# Patient Record
Sex: MALE | Race: WHITE | NOT HISPANIC OR LATINO | URBAN - METROPOLITAN AREA
[De-identification: names, ages, dates, MRNs, and addresses within clinical notes are randomized per-mention and may not be internally consistent; named-entity substitution may affect disease eponyms.]

---

## 2020-06-08 ENCOUNTER — OUTPATIENT (OUTPATIENT)
Dept: OUTPATIENT SERVICES | Facility: HOSPITAL | Age: 30
LOS: 1 days | End: 2020-06-08
Payer: COMMERCIAL

## 2020-06-08 DIAGNOSIS — Z11.59 ENCOUNTER FOR SCREENING FOR OTHER VIRAL DISEASES: ICD-10-CM

## 2020-06-09 PROCEDURE — U0003: CPT

## 2020-06-10 LAB — SARS-COV-2 RNA SPEC QL NAA+PROBE: SIGNIFICANT CHANGE UP

## 2021-01-10 ENCOUNTER — TRANSCRIPTION ENCOUNTER (OUTPATIENT)
Age: 31
End: 2021-01-10

## 2021-08-23 PROBLEM — Z00.00 ENCOUNTER FOR PREVENTIVE HEALTH EXAMINATION: Status: ACTIVE | Noted: 2021-08-23

## 2021-10-11 ENCOUNTER — NON-APPOINTMENT (OUTPATIENT)
Age: 31
End: 2021-10-11

## 2021-10-11 ENCOUNTER — APPOINTMENT (OUTPATIENT)
Dept: CARDIOLOGY | Facility: CLINIC | Age: 31
End: 2021-10-11
Payer: COMMERCIAL

## 2021-10-11 VITALS
TEMPERATURE: 98.7 F | HEIGHT: 69 IN | BODY MASS INDEX: 35.4 KG/M2 | OXYGEN SATURATION: 99 % | HEART RATE: 83 BPM | WEIGHT: 239 LBS | SYSTOLIC BLOOD PRESSURE: 140 MMHG | DIASTOLIC BLOOD PRESSURE: 97 MMHG

## 2021-10-11 DIAGNOSIS — Z83.438 FAMILY HISTORY OF OTHER DISORDER OF LIPOPROTEIN METABOLISM AND OTHER LIPIDEMIA: ICD-10-CM

## 2021-10-11 DIAGNOSIS — Z82.49 FAMILY HISTORY OF ISCHEMIC HEART DISEASE AND OTHER DISEASES OF THE CIRCULATORY SYSTEM: ICD-10-CM

## 2021-10-11 DIAGNOSIS — Z86.59 PERSONAL HISTORY OF OTHER MENTAL AND BEHAVIORAL DISORDERS: ICD-10-CM

## 2021-10-11 DIAGNOSIS — R10.9 UNSPECIFIED ABDOMINAL PAIN: ICD-10-CM

## 2021-10-11 DIAGNOSIS — R50.9 FEVER, UNSPECIFIED: ICD-10-CM

## 2021-10-11 DIAGNOSIS — Z78.9 OTHER SPECIFIED HEALTH STATUS: ICD-10-CM

## 2021-10-11 DIAGNOSIS — Z87.19 PERSONAL HISTORY OF OTHER DISEASES OF THE DIGESTIVE SYSTEM: ICD-10-CM

## 2021-10-11 DIAGNOSIS — J02.9 ACUTE PHARYNGITIS, UNSPECIFIED: ICD-10-CM

## 2021-10-11 PROCEDURE — 93000 ELECTROCARDIOGRAM COMPLETE: CPT

## 2021-10-11 PROCEDURE — 99204 OFFICE O/P NEW MOD 45 MIN: CPT

## 2021-10-11 NOTE — REVIEW OF SYSTEMS
[Fever] : fever [Headache] : headache [Chills] : chills [Sore Throat] : sore throat [Chest Discomfort] : chest discomfort [Abdominal Pain] : abdominal pain [Negative] : Heme/Lymph

## 2021-10-11 NOTE — HISTORY OF PRESENT ILLNESS
[FreeTextEntry1] : Edilson is a 31yo male who presents today for cardiac evaluation. He reports he had COVID 19 in 1/2021. Since then he has been experiencing abdominal pain, constipation and diarrhea, had EGD and sigmoidoscopy done 3/2021 and no significant findings. Was told he has hemorrhoids and likely muscle spasms of his abdomen, he was prescribed a medication which he sometimes takes. Patient also with chest pain, reports he had 3 echocardiograms done in the past year , all normal. Patient had a CXR done in the past year and was told this was normal. PFTs done with Pulmonologist which were also normal. Patient had EST done which was normal. Patient was prescribed Meloxicam for his chest pain but states this did not help and he is no longer using it. He was prescribed Prilosec for a few weeks which helped with his abdominal pain but he has since d/c'd use. He is also reporting subjective fevers and chills since having COVID 19. He is also continued to have headaches since receiving the COVID 19 vaccine first dose in August (Moderna). He is also reporting sore throat for the past few weeks. Patient has been prescribed Cymbalta and Amitriptylline for his symptoms with no improvement.

## 2021-10-11 NOTE — PHYSICAL EXAM
[Well Developed] : well developed [Well Nourished] : well nourished [No Acute Distress] : no acute distress [Normal Conjunctiva] : normal conjunctiva [Normal Venous Pressure] : normal venous pressure [No Carotid Bruit] : no carotid bruit [Normal S1, S2] : normal S1, S2 [No Murmur] : no murmur [No Rub] : no rub [No Gallop] : no gallop [Clear Lung Fields] : clear lung fields [Good Air Entry] : good air entry [No Respiratory Distress] : no respiratory distress  [Soft] : abdomen soft [Non Tender] : non-tender [No Masses/organomegaly] : no masses/organomegaly [Normal Bowel Sounds] : normal bowel sounds [Normal Gait] : normal gait [No Edema] : no edema [No Cyanosis] : no cyanosis [No Clubbing] : no clubbing [No Varicosities] : no varicosities [No Rash] : no rash [No Skin Lesions] : no skin lesions [Moves all extremities] : moves all extremities [No Focal Deficits] : no focal deficits [Normal Speech] : normal speech [Alert and Oriented] : alert and oriented [Normal memory] : normal memory [de-identified] : fluid nasal mucosa and fluid in ear  [de-identified] : normal genitals

## 2021-10-14 ENCOUNTER — APPOINTMENT (OUTPATIENT)
Dept: CARDIOLOGY | Facility: CLINIC | Age: 31
End: 2021-10-14
Payer: COMMERCIAL

## 2021-10-14 ENCOUNTER — NON-APPOINTMENT (OUTPATIENT)
Age: 31
End: 2021-10-14

## 2021-10-14 VITALS
TEMPERATURE: 98.3 F | OXYGEN SATURATION: 98 % | HEART RATE: 90 BPM | DIASTOLIC BLOOD PRESSURE: 80 MMHG | WEIGHT: 244 LBS | SYSTOLIC BLOOD PRESSURE: 148 MMHG | HEIGHT: 69 IN | BODY MASS INDEX: 36.14 KG/M2

## 2021-10-14 DIAGNOSIS — M79.602 PAIN IN LEFT ARM: ICD-10-CM

## 2021-10-14 DIAGNOSIS — R89.9 UNSPECIFIED ABNORMAL FINDING IN SPECIMENS FROM OTHER ORGANS, SYSTEMS AND TISSUES: ICD-10-CM

## 2021-10-14 DIAGNOSIS — R79.89 OTHER SPECIFIED ABNORMAL FINDINGS OF BLOOD CHEMISTRY: ICD-10-CM

## 2021-10-14 DIAGNOSIS — U07.1 COVID-19: ICD-10-CM

## 2021-10-14 PROCEDURE — XXXXX: CPT

## 2021-10-14 PROCEDURE — 93000 ELECTROCARDIOGRAM COMPLETE: CPT

## 2021-10-14 PROCEDURE — 99214 OFFICE O/P EST MOD 30 MIN: CPT

## 2021-10-14 PROCEDURE — 93306 TTE W/DOPPLER COMPLETE: CPT

## 2021-10-14 NOTE — HISTORY OF PRESENT ILLNESS
[FreeTextEntry1] : Edilson is a 31yo male who returns today for evaluation. Patient states that he is experiencing chest pain more severe than prior, patient reports his pain is an 8/10 and worse on palpation. He had recent CT chest and abdomen and pelvis done which were unremarkable. patient also with fever and chills today and also with left arm pain. Patient also reporting increased belching.

## 2021-10-15 ENCOUNTER — EMERGENCY (EMERGENCY)
Facility: HOSPITAL | Age: 31
LOS: 1 days | Discharge: HOME | End: 2021-10-15
Attending: EMERGENCY MEDICINE
Payer: COMMERCIAL

## 2021-10-15 ENCOUNTER — NON-APPOINTMENT (OUTPATIENT)
Age: 31
End: 2021-10-15

## 2021-10-15 VITALS
HEART RATE: 97 BPM | RESPIRATION RATE: 20 BRPM | DIASTOLIC BLOOD PRESSURE: 95 MMHG | TEMPERATURE: 99 F | OXYGEN SATURATION: 97 % | SYSTOLIC BLOOD PRESSURE: 137 MMHG | HEIGHT: 69 IN | WEIGHT: 235.01 LBS

## 2021-10-15 VITALS
RESPIRATION RATE: 16 BRPM | SYSTOLIC BLOOD PRESSURE: 117 MMHG | DIASTOLIC BLOOD PRESSURE: 58 MMHG | OXYGEN SATURATION: 99 % | HEART RATE: 68 BPM

## 2021-10-15 LAB
ALBUMIN SERPL ELPH-MCNC: 4.7 G/DL — SIGNIFICANT CHANGE UP (ref 3.3–5)
ALP SERPL-CCNC: 80 U/L — SIGNIFICANT CHANGE UP (ref 40–120)
ALT FLD-CCNC: 32 U/L — SIGNIFICANT CHANGE UP (ref 10–45)
ANION GAP SERPL CALC-SCNC: 13 MMOL/L — SIGNIFICANT CHANGE UP (ref 5–17)
AST SERPL-CCNC: 20 U/L — SIGNIFICANT CHANGE UP (ref 10–40)
BASOPHILS # BLD AUTO: 0.06 K/UL — SIGNIFICANT CHANGE UP (ref 0–0.2)
BASOPHILS NFR BLD AUTO: 0.5 % — SIGNIFICANT CHANGE UP (ref 0–2)
BILIRUB SERPL-MCNC: 0.4 MG/DL — SIGNIFICANT CHANGE UP (ref 0.2–1.2)
BUN SERPL-MCNC: 12 MG/DL — SIGNIFICANT CHANGE UP (ref 7–23)
CALCIUM SERPL-MCNC: 9.4 MG/DL — SIGNIFICANT CHANGE UP (ref 8.4–10.5)
CHLORIDE SERPL-SCNC: 103 MMOL/L — SIGNIFICANT CHANGE UP (ref 96–108)
CO2 SERPL-SCNC: 23 MMOL/L — SIGNIFICANT CHANGE UP (ref 22–31)
CREAT SERPL-MCNC: 0.91 MG/DL — SIGNIFICANT CHANGE UP (ref 0.5–1.3)
D DIMER BLD IA.RAPID-MCNC: <150 NG/ML DDU — SIGNIFICANT CHANGE UP
EOSINOPHIL # BLD AUTO: 0.02 K/UL — SIGNIFICANT CHANGE UP (ref 0–0.5)
EOSINOPHIL NFR BLD AUTO: 0.2 % — SIGNIFICANT CHANGE UP (ref 0–6)
GLUCOSE SERPL-MCNC: 83 MG/DL — SIGNIFICANT CHANGE UP (ref 70–99)
HCT VFR BLD CALC: 44.1 % — SIGNIFICANT CHANGE UP (ref 39–50)
HGB BLD-MCNC: 15.1 G/DL — SIGNIFICANT CHANGE UP (ref 13–17)
IMM GRANULOCYTES NFR BLD AUTO: 0.8 % — SIGNIFICANT CHANGE UP (ref 0–1.5)
LIDOCAIN IGE QN: 21 U/L — SIGNIFICANT CHANGE UP (ref 7–60)
LYMPHOCYTES # BLD AUTO: 17.9 % — SIGNIFICANT CHANGE UP (ref 13–44)
LYMPHOCYTES # BLD AUTO: 2.02 K/UL — SIGNIFICANT CHANGE UP (ref 1–3.3)
MAGNESIUM SERPL-MCNC: 2 MG/DL — SIGNIFICANT CHANGE UP (ref 1.6–2.6)
MCHC RBC-ENTMCNC: 30.4 PG — SIGNIFICANT CHANGE UP (ref 27–34)
MCHC RBC-ENTMCNC: 34.2 GM/DL — SIGNIFICANT CHANGE UP (ref 32–36)
MCV RBC AUTO: 88.7 FL — SIGNIFICANT CHANGE UP (ref 80–100)
MONOCYTES # BLD AUTO: 0.83 K/UL — SIGNIFICANT CHANGE UP (ref 0–0.9)
MONOCYTES NFR BLD AUTO: 7.3 % — SIGNIFICANT CHANGE UP (ref 2–14)
NEUTROPHILS # BLD AUTO: 8.29 K/UL — HIGH (ref 1.8–7.4)
NEUTROPHILS NFR BLD AUTO: 73.3 % — SIGNIFICANT CHANGE UP (ref 43–77)
NRBC # BLD: 0 /100 WBCS — SIGNIFICANT CHANGE UP (ref 0–0)
PLATELET # BLD AUTO: 327 K/UL — SIGNIFICANT CHANGE UP (ref 150–400)
POTASSIUM SERPL-MCNC: 3.9 MMOL/L — SIGNIFICANT CHANGE UP (ref 3.5–5.3)
POTASSIUM SERPL-SCNC: 3.9 MMOL/L — SIGNIFICANT CHANGE UP (ref 3.5–5.3)
PROT SERPL-MCNC: 7.3 G/DL — SIGNIFICANT CHANGE UP (ref 6–8.3)
RBC # BLD: 4.97 M/UL — SIGNIFICANT CHANGE UP (ref 4.2–5.8)
RBC # FLD: 12.3 % — SIGNIFICANT CHANGE UP (ref 10.3–14.5)
SARS-COV-2 RNA SPEC QL NAA+PROBE: SIGNIFICANT CHANGE UP
SODIUM SERPL-SCNC: 139 MMOL/L — SIGNIFICANT CHANGE UP (ref 135–145)
T3 SERPL-MCNC: 141 NG/DL — SIGNIFICANT CHANGE UP (ref 80–200)
T4 AB SER-ACNC: 7.9 UG/DL — SIGNIFICANT CHANGE UP (ref 4.6–12)
TROPONIN T, HIGH SENSITIVITY RESULT: <6 NG/L — SIGNIFICANT CHANGE UP (ref 0–51)
TSH SERPL-MCNC: 0.76 UIU/ML — SIGNIFICANT CHANGE UP (ref 0.27–4.2)
WBC # BLD: 11.31 K/UL — HIGH (ref 3.8–10.5)
WBC # FLD AUTO: 11.31 K/UL — HIGH (ref 3.8–10.5)

## 2021-10-15 PROCEDURE — 71046 X-RAY EXAM CHEST 2 VIEWS: CPT | Mod: 26

## 2021-10-15 PROCEDURE — 84480 ASSAY TRIIODOTHYRONINE (T3): CPT

## 2021-10-15 PROCEDURE — U0005: CPT

## 2021-10-15 PROCEDURE — 85379 FIBRIN DEGRADATION QUANT: CPT

## 2021-10-15 PROCEDURE — 84436 ASSAY OF TOTAL THYROXINE: CPT

## 2021-10-15 PROCEDURE — 99285 EMERGENCY DEPT VISIT HI MDM: CPT | Mod: 25

## 2021-10-15 PROCEDURE — 71046 X-RAY EXAM CHEST 2 VIEWS: CPT

## 2021-10-15 PROCEDURE — 96374 THER/PROPH/DIAG INJ IV PUSH: CPT

## 2021-10-15 PROCEDURE — 93005 ELECTROCARDIOGRAM TRACING: CPT

## 2021-10-15 PROCEDURE — 99285 EMERGENCY DEPT VISIT HI MDM: CPT

## 2021-10-15 PROCEDURE — 36415 COLL VENOUS BLD VENIPUNCTURE: CPT

## 2021-10-15 PROCEDURE — 80053 COMPREHEN METABOLIC PANEL: CPT

## 2021-10-15 PROCEDURE — 84484 ASSAY OF TROPONIN QUANT: CPT

## 2021-10-15 PROCEDURE — 84443 ASSAY THYROID STIM HORMONE: CPT

## 2021-10-15 PROCEDURE — 96375 TX/PRO/DX INJ NEW DRUG ADDON: CPT

## 2021-10-15 PROCEDURE — 83690 ASSAY OF LIPASE: CPT

## 2021-10-15 PROCEDURE — U0003: CPT

## 2021-10-15 PROCEDURE — 85025 COMPLETE CBC W/AUTO DIFF WBC: CPT

## 2021-10-15 PROCEDURE — 83735 ASSAY OF MAGNESIUM: CPT

## 2021-10-15 RX ORDER — PANTOPRAZOLE SODIUM 20 MG/1
40 TABLET, DELAYED RELEASE ORAL ONCE
Refills: 0 | Status: DISCONTINUED | OUTPATIENT
Start: 2021-10-15 | End: 2021-10-15

## 2021-10-15 RX ORDER — METOPROLOL TARTRATE 50 MG
50 TABLET ORAL ONCE
Refills: 0 | Status: DISCONTINUED | OUTPATIENT
Start: 2021-10-15 | End: 2021-10-15

## 2021-10-15 RX ORDER — KETOROLAC TROMETHAMINE 30 MG/ML
15 SYRINGE (ML) INJECTION ONCE
Refills: 0 | Status: DISCONTINUED | OUTPATIENT
Start: 2021-10-15 | End: 2021-10-15

## 2021-10-15 RX ORDER — FAMOTIDINE 10 MG/ML
20 INJECTION INTRAVENOUS ONCE
Refills: 0 | Status: COMPLETED | OUTPATIENT
Start: 2021-10-15 | End: 2021-10-15

## 2021-10-15 RX ORDER — METOPROLOL TARTRATE 50 MG
100 TABLET ORAL ONCE
Refills: 0 | Status: DISCONTINUED | OUTPATIENT
Start: 2021-10-15 | End: 2021-10-15

## 2021-10-15 RX ADMIN — Medication 15 MILLIGRAM(S): at 14:30

## 2021-10-15 RX ADMIN — Medication 15 MILLIGRAM(S): at 14:45

## 2021-10-15 RX ADMIN — Medication 30 MILLILITER(S): at 11:49

## 2021-10-15 RX ADMIN — FAMOTIDINE 20 MILLIGRAM(S): 10 INJECTION INTRAVENOUS at 11:49

## 2021-10-15 NOTE — ED PROVIDER NOTE - PROGRESS NOTE DETAILS
case d/w Dr. Vazquez - pt has had negative cardiac workup this year with persistent atypical chest pain. he requests a CTA/CTcoronary today, requests contacting Dr. Pang to expedite and protocolize - Joey Baum PA-C d/w CTcoronary team - team has a full schedule with emergent cases, will not be able to obtain CTC today. d/w Dr. Pang - will obtain CTA chest for now. he will call to try to add pt to CTC schedule - Joey Baum PA-C d/w Dr. Pang - reports he is trying to expedite CTC for this afternoon. pt reports minimal improvement of epigastric/CP from GI cocktail. +persistent chest wall tenderness. will give NSAID, reassess - Joey Baum PA-C Multiple conversations had between myself, Dr. Vazquez, Dr. Rebolledo regarding obtaining CTA heart w/coronaries today. CTC team would be unable to get pt test until after 17:00 today. pt expressed concerns about waiting in the hospital that long, perseverates on catching covid, claustrophobia, and declined anxiolytic. pt also has reservations about requiring pre-medication with metoprolol. given low suspicion of acute ACS with significantly low HEART score (0) with atypical CP reproducible with chest wall palpation, there is no acute need to get this test today in the ED and pt does not want admission. Will dc with follow up. Discussed plan and return precautions with patient who understands and agrees. All questions answered. - Joey Buam PA-C Multiple conversations had between myself, Dr. Vazquez, Dr. Rebolledo regarding obtaining CTA heart w/coronaries today. CTC team would be unable to get pt test until after 17:00 today. pt expressed concerns about waiting in the hospital that long, perseverates on catching covid, claustrophobia, and declined anxiolytic. pt also has reservations about requiring pre-medication with metoprolol. given low suspicion of acute ACS with significantly low HEART score (0) with atypical CP reproducible with chest wall palpation, there is no acute need to get this test today in the ED and pt does not want admission. pt was also seen by Dr. Pang who agrees very low suspicion for cardiac event. Will dc with follow up. Discussed plan and return precautions with patient who understands and agrees. All questions answered. - Joey Baum PA-C

## 2021-10-15 NOTE — ED ADULT TRIAGE NOTE - CHIEF COMPLAINT QUOTE
General Sunscreen Counseling: I recommended a broad spectrum sunscreen with a SPF of 30 or higher.  I explained that SPF 30 sunscreens block approximately 97 percent of the sun's harmful rays.  Sunscreens should be applied at least 15 minutes prior to expected sun exposure and then every 2 hours after that as long as sun exposure continues. If swimming or exercising sunscreen should be reapplied every 45 minutes to an hour after getting wet or sweating.  One ounce, or the equivalent of a shot glass full of sunscreen, is adequate to protect the skin not covered by a bathing suit. I also recommended a lip balm with a sunscreen as well. Sun protective clothing can be used in lieu of sunscreen but must be worn the entire time you are exposed to the sun's rays.
Detail Level: Zone
chest pain sent by dr arciniega for "CT scans"  c/o chills as well

## 2021-10-15 NOTE — ED PROVIDER NOTE - CLINICAL SUMMARY MEDICAL DECISION MAKING FREE TEXT BOX
Patient sent in by PMD for CT coronary and dissection study. Patient vitals wnl. Patient 29 y/o without cardiac risk factors. Unlikely cardiac etiology of patient persistent symptoms for months. Possible post viral syndrome for patient with also persistent anxiety. Patient has had repeated echos and recent CT chest. Patient has taken multiple covid tests, most recent last night, that were negative. Patient had Covid in Jan 2021.  Will attempt to carry-out PMD request. Discussed with patient that it is my belief that he is continuing to receive to many diagnostic tests and the post viral syndrome should be monitored longitudinally as outpatient.  I understand that patient is receiving differing opinions on his health status and I explained this to the patient also.

## 2021-10-15 NOTE — ED PROVIDER NOTE - PATIENT PORTAL LINK FT
You can access the FollowMyHealth Patient Portal offered by [facility] by registering at the following website: [email or web address]. By joining [employer]’s FollowMyHealth portal, you will also be able to view your health information using other applications (apps) compatible with our system.

## 2021-10-15 NOTE — ED ADULT NURSE REASSESSMENT NOTE - NS ED NURSE REASSESS COMMENT FT1
1435 Cardiologist Dr Poly callaway pt. Pt anxious. States he is afraid something bad will happen to him if he goes home. Reassurance given. Pt states his chest still hurts on palpation diffusely. Slight relief from Toradol

## 2021-10-15 NOTE — ED CLERICAL - NS ED CLERK NOTE PRE-ARRIVAL INFORMATION; ADDITIONAL PRE-ARRIVAL INFORMATION
CC/Reason For referral: seen yesterday for chest pain, d-dimer & troponin normal.  today increased chest pain  Preferred Consultant(if applicable):  Who admits for you (if needed):  Do you have documents you would like to fax over?  Would you still like to speak to an ED attending? please call after patient is seen

## 2021-10-15 NOTE — ED ADULT NURSE NOTE - OBJECTIVE STATEMENT
1105 30 yr old WM sent to ER by Dr Espinosa for Chest CT, further eval and tx. c/o persistent intermittent chills, bodyaches, chest pain, weight loss and generalized weakness x 2 months. EKG was done in triage. States blood tests and COVID test done last night, no results yet. Pt anxious. States he is very scared. Cardiac monitor intact. Airborne and contact isolation maintained. [facility] COVID19 Jan 2021. Was not hospitalized at that time. Color pink. Skin W&D 1105 30 yr old WM sent to ER by Dr Espinosa for Chest CT, further eval and tx. c/o persistent intermittent chills, bodyaches, chest pain, weight loss and generalized weakness x 2 months. EKG was done in triage. States blood tests and COVID test done last night, no results yet. Pt anxious. States he is very scared. Cardiac monitor intact. Airborne and contact isolation maintained. [facility] COVID19 Jan 2021. Was not hospitalized at that time. Color pink. Skin W&D. Lungs clear. Chest TTP diffusely. 1105 30 yr old WM sent to ER by Dr Espinosa for Chest CT, further eval and tx. c/o persistent intermittent chills, bodyaches, chest pain, weight loss and generalized weakness x 2 months. EKG was done in triage. States blood tests and COVID test done last night, no results yet. Pt anxious. States he is very scared. Cardiac monitor intact. Airborne and contact isolation maintained. [facility] COVID19 Jan 2021. Was not hospitalized at that time. Color pink. Skin W&D. Lungs clear. Chest TTP diffusely.. Pt states he is on doxycycline for a few days for ear infection.

## 2021-10-15 NOTE — ED PROVIDER NOTE - ATTENDING CONTRIBUTION TO CARE
I, Benito Rebolledo, performed a history and physical exam of the patient and discussed their management with the resident and /or advanced care provider. I reviewed the resident and /or ACP's note and agree with the documented findings and plan of care. I was present and available for all procedures.  Patient sent in by PMD for CT coronary and dissection study. Patient vitals wnl. Patient 31 y/o without cardiac risk factors. Unlikely cardiac etiology of patient persistent symptoms for months. Possible post viral syndrome for patient with also persistent anxiety. Patient has had repeated echos and recent CT chest. Patient has taken multiple covid tests, most recent last night, that were negative. Patient had Covid in Jan 2021.  Will attempt to carry-out PMD request. Discussed with patient that it is my belief that he is continuing to receive to many diagnostic tests and the post viral syndrome should be monitored longitudinally as outpatient.  I understand that patient is receiving differing opinions on his health status and I explained this to the patient also.

## 2021-10-15 NOTE — CONSULT NOTE ADULT - SUBJECTIVE AND OBJECTIVE BOX
DATE OF SERVICE: 10-15-21      CHIEF COMPLAINT:Patient is a 30y old  Male who presents with a chief complaint of     HISTORY OF PRESENT ILLNESS: HPI: 30y male PMHx COVID-19 infection Jan 2021 p/w chest pain. Pt reports intermittent chest pain x1 yr worse in the last few weeks. the CP is aching, reproducible with palpation of the chest wall, sometimes burning worse after eating. pt denies worse with exertion, position, or after eating. nonsmoker. no FHx cardiac dz at age <65. pt reports since having covid in January he had had migraines, tactile fever, severe reflux, intermittent chest pain (feels different currently), and a lump in his neck. pt currently on abx for an ear infection. pt perseverates "I don't want to die. I hope its not COVID again" denies SI/HI. Pt denies SOB, BENITES, extremity swelling, weight loss, recent travel, body aches, nausea, vomiting, diarrhea, or constipation. no known sick contacts. pt has had negative workup this week including troponin, d-dimer, and CT chest/abd/pelvis though pt reports CT chest was limited. p\      PAST MEDICAL & SURGICAL HISTORY:  COVID-19  Jan 2021    No significant past surgical history        FAMILY HISTORY:      Non-contributory    SOCIAL HISTORY:    not a smoker  Allergies    No Known Allergies    Intolerances    	    REVIEW OF SYSTEMS:  CONSTITUTIONAL: No fever  EYES: No eye pain, visual disturbances, or discharge  ENMT:  No difficulty hearing, tinnitus  NECK: No pain or stiffness  RESPIRATORY: No cough, wheezing,  CARDIOVASCULAR: + chest pain, no palpitations, passing out, dizziness, or leg swelling  GASTROINTESTINAL:  No nausea, vomiting, diarrhea or constipation. No melena.  GENITOURINARY: No dysuria, hematuria  NEUROLOGICAL: No stroke like symptoms  SKIN: No burning or lesions   ENDOCRINE: No heat or cold intolerance  MUSCULOSKELETAL: No joint pain or swelling  PSYCHIATRIC: No  anxiety, mood swings  HEME/LYMPH: No bleeding gums  ALLERGY AND IMMUNOLOGIC: No hives or eczema	    All other ROS negative    PHYSICAL EXAM:  T(C): 36.9 (10-15-21 @ 13:20), Max: 37 (10-15-21 @ 10:30)  HR: 68 (10-15-21 @ 14:45) (68 - 97)  BP: 117/58 (10-15-21 @ 14:45) (117/58 - 160/84)  RR: 16 (10-15-21 @ 14:45) (16 - 20)  SpO2: 99% (10-15-21 @ 14:45) (97% - 99%)  Wt(kg): --  I&O's Summary      Appearance: Normal	  HEENT:   Normal oral mucosa, EOMI	  Cardiovascular:  S1 S2, No JVD,    Respiratory: Lungs clear to auscultation	  Psychiatry: Alert  Gastrointestinal:  Soft, Non-tender, + BS	  Skin: No rashes   Neurologic: Non-focal  Extremities:  No edema  Vascular: Peripheral pulses palpable    	    	  	  CARDIAC MARKERS:  Labs personally reviewed by me                                  15.1   11.31 )-----------( 327      ( 15 Oct 2021 11:46 )             44.1     10-15    139  |  103  |  12  ----------------------------<  83  3.9   |  23  |  0.91    Ca    9.4      15 Oct 2021 11:46  Mg     2.0     10-15    TPro  7.3  /  Alb  4.7  /  TBili  0.4  /  DBili  x   /  AST  20  /  ALT  32  /  AlkPhos  80  10-15          EKG: Personally reviewed by me - NSR  Radiology: Personally reviewed by me - CXR clear lungs      Assessment /Plan:   Chest pain -- appears MSK in origin, reproducible to palpation, not exertional, pinpoint location  - NSAIDS PRN  - PE ruled out  - no further cardiac work up at this time         Differential diagnosis and plan of care discussed with patient after the evaluation. Counseling on diet, nutritional counseling, weight management, exercise and medication compliance was done.   Advanced care planning/advanced directives discussed with patient/family. DNR status including forceful chest compressions to attempt to restart the heart, ventilator support/artificial breathing, electric shock, artificial nutrition, health care proxy, Molst form all discussed with pt. Pt wishes to consider. More than fifteen minutes spent on discussing advanced directives. OMT on six regions for acute somatic dysfunctions done at the bedside.           Lucas Pang DO [facility]  Cardiovascular Medicine  [address] [place] [address], [address]  Office [phone]  Cell [phone]

## 2021-10-15 NOTE — ED PROVIDER NOTE - OBJECTIVE STATEMENT
30y male PMHx COVID-19 infection Jan 2021 p/w chest pain. Pt reports intermittent chest pain x1 yr worse in the last few weeks. the CP is aching, reproducible with palpation of the chest wall, sometimes burning worse after eating. pt denies worse with exertion, position, or after eating. nonsmoker. no FHx cardiac dz at age <65. pt reports since having covid in January he had had migraines, tactile fever, severe reflux, intermittent chest pain (feels different currently), and a lump in his neck. pt currently on abx for an ear infection. Pt denies SOB, BENITES, extremity swelling, weight loss, recent travel, body aches, nausea, vomiting, diarrhea, or constipation. no known sick contacts. pt has had negative workup this week including troponin, d-dimer, and CT chest/abd/pelvis though pt reports CT chest was limited. pt was sent by cardiology Dr. Vazquez for CTA chest 30y male PMHx COVID-19 infection Jan 2021 p/w chest pain. Pt reports intermittent chest pain x1 yr worse in the last few weeks. the CP is aching, reproducible with palpation of the chest wall, sometimes burning worse after eating. pt denies worse with exertion, position, or after eating. nonsmoker. no FHx cardiac dz at age <65. pt reports since having covid in January he had had migraines, tactile fever, severe reflux, intermittent chest pain (feels different currently), and a lump in his neck. pt currently on abx for an ear infection. pt perseverates "I don't want to die. I hope its not COVID again" denies SI/HI. Pt denies SOB, BENITES, extremity swelling, weight loss, recent travel, body aches, nausea, vomiting, diarrhea, or constipation. no known sick contacts. pt has had negative workup this week including troponin, d-dimer, and CT chest/abd/pelvis though pt reports CT chest was limited. pt was sent by cardiology Dr. Vazquez for CTA chest

## 2021-10-15 NOTE — ED PROVIDER NOTE - NSFOLLOWUPINSTRUCTIONS_ED_ALL_ED_FT
Follow up with your PMD and/or cardiologist within 48-72 hours.   *Bring a copy of all printed lab/test results with you.*    Take all of your other medications as previously prescribed.  Take ibuprofen 400-600mg every 6 hrs as needed for pain. Take with food     Return to the ED for worsening chest pain, shortness of breath, loss of consciousness, or any other concerns.

## 2021-10-18 ENCOUNTER — NON-APPOINTMENT (OUTPATIENT)
Age: 31
End: 2021-10-18

## 2021-10-18 ENCOUNTER — APPOINTMENT (OUTPATIENT)
Dept: CARDIOLOGY | Facility: CLINIC | Age: 31
End: 2021-10-18
Payer: COMMERCIAL

## 2021-10-18 ENCOUNTER — INPATIENT (INPATIENT)
Facility: HOSPITAL | Age: 31
LOS: 2 days | Discharge: HOME | DRG: 206 | End: 2021-10-21
Attending: INTERNAL MEDICINE | Admitting: INTERNAL MEDICINE
Payer: COMMERCIAL

## 2021-10-18 VITALS
HEART RATE: 94 BPM | WEIGHT: 235.01 LBS | TEMPERATURE: 99 F | OXYGEN SATURATION: 97 % | SYSTOLIC BLOOD PRESSURE: 141 MMHG | RESPIRATION RATE: 18 BRPM | HEIGHT: 69 IN | DIASTOLIC BLOOD PRESSURE: 92 MMHG

## 2021-10-18 DIAGNOSIS — R07.9 CHEST PAIN, UNSPECIFIED: ICD-10-CM

## 2021-10-18 DIAGNOSIS — R07.89 OTHER CHEST PAIN: ICD-10-CM

## 2021-10-18 DIAGNOSIS — Z86.16 PERSONAL HISTORY OF COVID-19: ICD-10-CM

## 2021-10-18 DIAGNOSIS — Z29.9 ENCOUNTER FOR PROPHYLACTIC MEASURES, UNSPECIFIED: ICD-10-CM

## 2021-10-18 DIAGNOSIS — R63.4 ABNORMAL WEIGHT LOSS: ICD-10-CM

## 2021-10-18 DIAGNOSIS — K21.9 GASTRO-ESOPHAGEAL REFLUX DISEASE WITHOUT ESOPHAGITIS: ICD-10-CM

## 2021-10-18 DIAGNOSIS — F41.9 ANXIETY DISORDER, UNSPECIFIED: ICD-10-CM

## 2021-10-18 DIAGNOSIS — D72.829 ELEVATED WHITE BLOOD CELL COUNT, UNSPECIFIED: ICD-10-CM

## 2021-10-18 PROBLEM — U07.1 COVID-19: Chronic | Status: ACTIVE | Noted: 2021-10-15

## 2021-10-18 LAB
ALBUMIN SERPL ELPH-MCNC: 4.8 G/DL — SIGNIFICANT CHANGE UP (ref 3.3–5)
ALP SERPL-CCNC: 84 U/L — SIGNIFICANT CHANGE UP (ref 40–120)
ALT FLD-CCNC: 37 U/L — SIGNIFICANT CHANGE UP (ref 10–45)
ANION GAP SERPL CALC-SCNC: 12 MMOL/L — SIGNIFICANT CHANGE UP (ref 5–17)
AST SERPL-CCNC: 21 U/L — SIGNIFICANT CHANGE UP (ref 10–40)
BASOPHILS # BLD AUTO: 0.06 K/UL — SIGNIFICANT CHANGE UP (ref 0–0.2)
BASOPHILS NFR BLD AUTO: 0.3 % — SIGNIFICANT CHANGE UP (ref 0–2)
BILIRUB SERPL-MCNC: 0.6 MG/DL — SIGNIFICANT CHANGE UP (ref 0.2–1.2)
BUN SERPL-MCNC: 10 MG/DL — SIGNIFICANT CHANGE UP (ref 7–23)
CALCIUM SERPL-MCNC: 9.8 MG/DL — SIGNIFICANT CHANGE UP (ref 8.4–10.5)
CHLORIDE SERPL-SCNC: 102 MMOL/L — SIGNIFICANT CHANGE UP (ref 96–108)
CO2 SERPL-SCNC: 24 MMOL/L — SIGNIFICANT CHANGE UP (ref 22–31)
CREAT SERPL-MCNC: 1.1 MG/DL — SIGNIFICANT CHANGE UP (ref 0.5–1.3)
EOSINOPHIL # BLD AUTO: 0.01 K/UL — SIGNIFICANT CHANGE UP (ref 0–0.5)
EOSINOPHIL NFR BLD AUTO: 0.1 % — SIGNIFICANT CHANGE UP (ref 0–6)
GLUCOSE SERPL-MCNC: 85 MG/DL — SIGNIFICANT CHANGE UP (ref 70–99)
HCT VFR BLD CALC: 45.5 % — SIGNIFICANT CHANGE UP (ref 39–50)
HGB BLD-MCNC: 15.2 G/DL — SIGNIFICANT CHANGE UP (ref 13–17)
HPIV2 RNA SPEC QL NAA+PROBE: DETECTED
IMM GRANULOCYTES NFR BLD AUTO: 0.7 % — SIGNIFICANT CHANGE UP (ref 0–1.5)
LYMPHOCYTES # BLD AUTO: 14.1 % — SIGNIFICANT CHANGE UP (ref 13–44)
LYMPHOCYTES # BLD AUTO: 2.59 K/UL — SIGNIFICANT CHANGE UP (ref 1–3.3)
MAGNESIUM SERPL-MCNC: 1.9 MG/DL — SIGNIFICANT CHANGE UP (ref 1.6–2.6)
MCHC RBC-ENTMCNC: 29.6 PG — SIGNIFICANT CHANGE UP (ref 27–34)
MCHC RBC-ENTMCNC: 33.4 GM/DL — SIGNIFICANT CHANGE UP (ref 32–36)
MCV RBC AUTO: 88.5 FL — SIGNIFICANT CHANGE UP (ref 80–100)
MONOCYTES # BLD AUTO: 1.03 K/UL — HIGH (ref 0–0.9)
MONOCYTES NFR BLD AUTO: 5.6 % — SIGNIFICANT CHANGE UP (ref 2–14)
NEUTROPHILS # BLD AUTO: 14.59 K/UL — HIGH (ref 1.8–7.4)
NEUTROPHILS NFR BLD AUTO: 79.2 % — HIGH (ref 43–77)
NRBC # BLD: 0 /100 WBCS — SIGNIFICANT CHANGE UP (ref 0–0)
PLATELET # BLD AUTO: 346 K/UL — SIGNIFICANT CHANGE UP (ref 150–400)
POTASSIUM SERPL-MCNC: 3.8 MMOL/L — SIGNIFICANT CHANGE UP (ref 3.5–5.3)
POTASSIUM SERPL-SCNC: 3.8 MMOL/L — SIGNIFICANT CHANGE UP (ref 3.5–5.3)
PROT SERPL-MCNC: 7.9 G/DL — SIGNIFICANT CHANGE UP (ref 6–8.3)
RAPID RVP RESULT: DETECTED
RBC # BLD: 5.14 M/UL — SIGNIFICANT CHANGE UP (ref 4.2–5.8)
RBC # FLD: 12.3 % — SIGNIFICANT CHANGE UP (ref 10.3–14.5)
SARS-COV-2 RNA SPEC QL NAA+PROBE: SIGNIFICANT CHANGE UP
SARS-COV-2 RNA SPEC QL NAA+PROBE: SIGNIFICANT CHANGE UP
SODIUM SERPL-SCNC: 138 MMOL/L — SIGNIFICANT CHANGE UP (ref 135–145)
TROPONIN T, HIGH SENSITIVITY RESULT: <6 NG/L — SIGNIFICANT CHANGE UP (ref 0–51)
WBC # BLD: 18.4 K/UL — HIGH (ref 3.8–10.5)
WBC # FLD AUTO: 18.4 K/UL — HIGH (ref 3.8–10.5)

## 2021-10-18 PROCEDURE — 93015 CV STRESS TEST SUPVJ I&R: CPT

## 2021-10-18 PROCEDURE — 99285 EMERGENCY DEPT VISIT HI MDM: CPT

## 2021-10-18 PROCEDURE — 71275 CT ANGIOGRAPHY CHEST: CPT | Mod: 26

## 2021-10-18 PROCEDURE — 93010 ELECTROCARDIOGRAM REPORT: CPT

## 2021-10-18 PROCEDURE — 71046 X-RAY EXAM CHEST 2 VIEWS: CPT | Mod: 26

## 2021-10-18 PROCEDURE — 99223 1ST HOSP IP/OBS HIGH 75: CPT

## 2021-10-18 RX ORDER — ESCITALOPRAM OXALATE 10 MG/1
1 TABLET, FILM COATED ORAL
Qty: 0 | Refills: 0 | DISCHARGE

## 2021-10-18 RX ORDER — LANOLIN ALCOHOL/MO/W.PET/CERES
3 CREAM (GRAM) TOPICAL AT BEDTIME
Refills: 0 | Status: DISCONTINUED | OUTPATIENT
Start: 2021-10-18 | End: 2021-10-21

## 2021-10-18 RX ORDER — ALUMINUM ZIRCONIUM TRICHLOROHYDREX GLY 0.2 G/G
1 STICK TOPICAL
Qty: 0 | Refills: 0 | DISCHARGE

## 2021-10-18 RX ORDER — ENOXAPARIN SODIUM 100 MG/ML
40 INJECTION SUBCUTANEOUS DAILY
Refills: 0 | Status: DISCONTINUED | OUTPATIENT
Start: 2021-10-18 | End: 2021-10-21

## 2021-10-18 RX ORDER — ACETAMINOPHEN 500 MG
650 TABLET ORAL EVERY 6 HOURS
Refills: 0 | Status: DISCONTINUED | OUTPATIENT
Start: 2021-10-18 | End: 2021-10-21

## 2021-10-18 RX ORDER — MAGNESIUM SULFATE 500 MG/ML
1 VIAL (ML) INJECTION ONCE
Refills: 0 | Status: COMPLETED | OUTPATIENT
Start: 2021-10-18 | End: 2021-10-18

## 2021-10-18 RX ORDER — ONDANSETRON 8 MG/1
4 TABLET, FILM COATED ORAL EVERY 8 HOURS
Refills: 0 | Status: DISCONTINUED | OUTPATIENT
Start: 2021-10-18 | End: 2021-10-21

## 2021-10-18 RX ORDER — LACTOBACILLUS ACIDOPHILUS 100MM CELL
1 CAPSULE ORAL DAILY
Refills: 0 | Status: DISCONTINUED | OUTPATIENT
Start: 2021-10-18 | End: 2021-10-21

## 2021-10-18 RX ORDER — MELOXICAM 15 MG/1
1 TABLET ORAL
Qty: 0 | Refills: 0 | DISCHARGE

## 2021-10-18 RX ORDER — PANTOPRAZOLE SODIUM 20 MG/1
40 TABLET, DELAYED RELEASE ORAL
Refills: 0 | Status: DISCONTINUED | OUTPATIENT
Start: 2021-10-18 | End: 2021-10-21

## 2021-10-18 RX ORDER — POTASSIUM CHLORIDE 20 MEQ
20 PACKET (EA) ORAL ONCE
Refills: 0 | Status: COMPLETED | OUTPATIENT
Start: 2021-10-18 | End: 2021-10-18

## 2021-10-18 RX ADMIN — Medication 20 MILLIEQUIVALENT(S): at 18:46

## 2021-10-18 RX ADMIN — Medication 100 GRAM(S): at 18:46

## 2021-10-18 NOTE — H&P ADULT - NSHPPHYSICALEXAM_GEN_ALL_CORE
CONSTITUTIONAL: NAD, well-developed, well-groomed  EYES: PERRLA; conjunctiva and sclera clear  ENMT: Moist oral mucosa, no pharyngeal injection or exudates; normal dentition  NECK: Supple, no palpable masses; no thyromegaly  RESPIRATORY: Normal respiratory effort; lungs are clear to auscultation bilaterally  CARDIOVASCULAR: +reproducible anterior chest wall pain b/l, +Regular rate and rhythm, normal S1 and S2, no murmur/rub/gallop; No lower extremity edema; Peripheral pulses are 2+ bilaterally  ABDOMEN: Soft, Non-distended,  Nontender to palpation, normoactive bowel sounds  MUSCULOSKELETAL:  No clubbing or cyanosis of digits; no joint swelling or tenderness to palpation  PSYCH: A+O to person, place, and time; affect appropriate, anxious appearing, perseverating requiring a lot of reassurance  NEUROLOGY: CN 2-12 are intact and symmetric; no gross sensory deficits   SKIN: No rashes; no palpable lesions

## 2021-10-18 NOTE — H&P ADULT - PROBLEM SELECTOR PLAN 3
very anxious on exam. several times pausing to ask "am I going to die?" or "please tell me I'm not going to die" for reassurance.  - was prescribed escitalopram 5mg daily by his PCP recently but states he has not been taking it as he had adverse side effects from another medication (cymbalta)  - would benefit from behavioral health eval while here he also endorses abd pain, nausea, belching, dry heaving and 30lb weight loss in last several months. he had EGD that he states was negative. is on and off PPI but does feel better when on PPI. would check to see if they tested for h. pylori during last EGD  - though doesn't endorse diarrhea, does have abd pain, n/v, excessive belching, bloating, dry heaving and unintentional weight loss. possible h. pylori vs. small intestinal bacterial overgrowth (SIBO) can be on ddx. may benefit from repeat GI eval if cardiac eval unremarkable

## 2021-10-18 NOTE — H&P ADULT - PROBLEM SELECTOR PLAN 4
takes omeprazole on and off. has been off it for few weeks now  - states had EGD that was unremarkable  - start pantoprazole 40mg daily  - maalox PRN very anxious on exam. several times pausing to ask "am I going to die?" or "please tell me I'm not going to die" for reassurance.  - was prescribed escitalopram 5mg daily by his PCP recently but states he has not been taking it as he had adverse side effects from another medication (cymbalta)  - would benefit from behavioral health eval while here

## 2021-10-18 NOTE — H&P ADULT - NSHPADDITIONALINFOADULT_GEN_ALL_CORE
.  Mirna Agustin MD  Division of Hospital Medicine  [facility]   Pager: [phone]    Plan discussed with patient, father bedside, Cardiology attending Dr. Pang, and medicine ACP Makayla.

## 2021-10-18 NOTE — ED PROVIDER NOTE - PROGRESS NOTE DETAILS
Ever Lou PGY-2 spoke with Dr. Vazquez, recommending admission under Dr. Francisco. Dr francisco at bedside, state will admit for CTA, CT coronary and psych evaluation for anxiety

## 2021-10-18 NOTE — ED ADULT NURSE NOTE - OBJECTIVE STATEMENT
30y male presents to the ER c/o chest pain. pt is alert and oriented x 4 and speaking coherently. Pt states he has had intermittent chest pain x1 year worse in the last few weeks. pt describes the chest pain as aching, the pain is reproducible with palpation of the chest wall. sine redness noted to chest "Pt states I was rubbing it"  pt denies worse with exertion, position, or after eating. nonsmoker.  pt currently on abx for an ear infection. Pt believes he has covid again and states "I don't want to die". Pt placed on CM, EKG completed. VSS. pt afebrile. Md callaway completed. will reassess.

## 2021-10-18 NOTE — ED PROVIDER NOTE - NS ED ROS FT
Constitutional: No fever, chills.  Eyes:  No visual changes  ENMT:  No neck pain  Cardiac:  +chest pain  Respiratory:  No cough, SOB  GI:  No nausea, vomiting, diarrhea, abdominal pain.  :  No dysuria, hematuria  MS:  No back pain.  Neuro:  No headache or lightheadedness  Skin:  No skin rash  Except as documented in the HPI,  all other systems are negative.

## 2021-10-18 NOTE — CONSULT NOTE ADULT - SUBJECTIVE AND OBJECTIVE BOX
DATE OF SERVICE: 10-18-21     CHIEF COMPLAINT:Patient is a 30y old  Male who presents with a chief complaint of chest pain (18 Oct 2021 18:49)      HISTORY OF PRESENT ILLNESS:HPI:  29 yo male with PMH of COVID-19 infection in Jan 2021 who presents with subacute worsening of his is intermittent chest pain over last few weeks. He states ever since his COVID-19 infection, he has not felt the same and has had intermittent sharp/burning intermittent chest pains along with GI upset. He describes the chest pain as sometimes sharp and sometimes burning both in his anterior left chest wall and anterior right chest wall, reproducible with palpation. Exacerbated with certain movements, positional changes, and sometimes after eating. His symptoms associated with subjective fever, migraine headaches, occasional lightheadedness, sore throat, nausea, dry heaving, and belching. Denies any recent sick contacts, cough, dyspnea, diarrhea, constipation, dysuria nor frequency. Of note, he recently had b/l earache for which he was taking doxycycline for since 10/12/21 (last dose this morning) with symptoms resolved. Patient was sent into the ED by his PCP for further evaluation.    In the ED, vitals unremarkable. Labs notable for leukocytosis to 18K, trop neg x 2, COVID-19 PCR negative. CXR with clear lungs.  (18 Oct 2021 18:49)      PAST MEDICAL & SURGICAL HISTORY:  COVID-19 Jan 2021    No significant past surgical history      MEDICATIONS:  enoxaparin Injectable 40 milliGRAM(s) SubCutaneous daily        acetaminophen   Tablet .. 650 milliGRAM(s) Oral every 6 hours PRN  melatonin 3 milliGRAM(s) Oral at bedtime PRN  ondansetron Injectable 4 milliGRAM(s) IV Push every 8 hours PRN    aluminum hydroxide/magnesium hydroxide/simethicone Suspension 30 milliLiter(s) Oral every 4 hours PRN  pantoprazole    Tablet 40 milliGRAM(s) Oral before breakfast          FAMILY HISTORY:      Non-contributory    SOCIAL HISTORY:    [ ] not a smoker    Allergies    No Known Allergies    Intolerances    	    REVIEW OF SYSTEMS:  CONSTITUTIONAL: No fever  EYES: No eye pain, visual disturbances, or discharge  ENMT:  No difficulty hearing, tinnitus  NECK: No pain or stiffness  RESPIRATORY: No cough, wheezing,  CARDIOVASCULAR: No chest pain, palpitations, passing out, dizziness, or leg swelling  GASTROINTESTINAL:  No nausea, vomiting, diarrhea or constipation. No melena.  GENITOURINARY: No dysuria, hematuria  NEUROLOGICAL: No stroke like symptoms  SKIN: No burning or lesions   ENDOCRINE: No heat or cold intolerance  MUSCULOSKELETAL: No joint pain or swelling  PSYCHIATRIC: No  anxiety, mood swings  HEME/LYMPH: No bleeding gums  ALLERGY AND IMMUNOLOGIC: No hives or eczema	    All other ROS negative    PHYSICAL EXAM:  T(C): 37 (10-18-21 @ 20:39), Max: 37.6 (10-18-21 @ 14:23)  HR: 79 (10-18-21 @ 20:39) (79 - 95)  BP: 148/82 (10-18-21 @ 20:39) (112/67 - 163/95)  RR: 18 (10-18-21 @ 20:39) (16 - 18)  SpO2: 96% (10-18-21 @ 20:39) (96% - 100%)  Wt(kg): --  I&O's Summary      Appearance: Normal	  HEENT:   Normal oral mucosa, EOMI	  Cardiovascular:  S1 S2, No JVD,    Respiratory: Lungs clear to auscultation	  Psychiatry: Alert  Gastrointestinal:  Soft, Non-tender, + BS	  Skin: No rashes   Neurologic: Non-focal  Extremities:  No edema  Vascular: Peripheral pulses palpable    	    	  	  CARDIAC MARKERS:  Labs personally reviewed by me                                  15.2   18.40 )-----------( 346      ( 18 Oct 2021 15:57 )             45.5     10-18    138  |  102  |  10  ----------------------------<  85  3.8   |  24  |  1.10    Ca    9.8      18 Oct 2021 15:57  Mg     1.9     10-18    TPro  7.9  /  Alb  4.8  /  TBili  0.6  /  DBili  x   /  AST  21  /  ALT  37  /  AlkPhos  84  10-18          EKG: Personally reviewed by me - nsr  Radiology: Personally reviewed by me - chest Gissel clear lungs      Assessment /Plan:   Assessment:  · Assessment	  29 yo male with PMH of COVID-19 infection in Jan 2021 who presents with subacute worsening of his is intermittent chest pain over last few weeks.    Problem/Plan - 1:  ·  Problem: Atypical chest pain.   ·  Plan: less likely cardiac  - f/u CTA chest to r/o PE  - plan for CT coronaries     Problem/Plan - 2:  ·  Problem: Leukocytosis.   ·  Plan: has history of persistent leukocytosis for which he had seen hematology for.  - leukocytosis to 18K today with left shift currently  - +parainfluzenza  - check procal in AM   - will check set of blood culture with AM labs  - hematology eval in morning.- Dr Fady Servin   - ID eval in AM    Problem/Plan - 3:  ·  Problem: Anxiety.  ·  Plan: very anxious on exam. several times pausing to ask "am I going to die?" or "please tell me I'm not going to die" for reassurance.  - was prescribed escitalopram 5mg daily by his PCP recently but states he has not been taking it as he had adverse side effects from another medication (cymbalta)  - Psychiatry consult in AM    Problem/Plan - 4:  ·  Problem: GERD (gastroesophageal reflux disease).  ·  Plan: takes omeprazole on and off. has been off it for few weeks now  - states had EGD that was unremarkable  - start pantoprazole 40mg daily  - maalox PRN.- GI eval Dr Stiles    Problem/Plan - 5:  ·  Problem: Prophylactic measure.   ·  Plan: DVT ppx: lovenox.      Differential diagnosis and plan of care discussed with patient after the evaluation. Counseling on diet, nutritional counseling, weight management, exercise and medication compliance was done.   Advanced care planning/advanced directives discussed with patient/family. DNR status including forceful chest compressions to attempt to restart the heart, ventilator support/artificial breathing, electric shock, artificial nutrition, health care proxy, Molst form all discussed with pt. Pt wishes to consider. More than fifteen minutes spent on discussing advanced directives. OMT on six regions for acute somatic dysfunctions done at the bedside.        Lucas Pang DO [facility]  Cardiovascular Medicine  [address], [address]  Office [phone]  Cell [phone]

## 2021-10-18 NOTE — H&P ADULT - PROBLEM SELECTOR PLAN 1
has intermittent sharp/burning chest pain on both anterior left and right chest wall reproducible with palpation.   low suspicion for cardiac etiology. suspect MSK vs. GI/GERD related.  - trop negative x 2, EKG with no ischemic changes  - f/u CTA chest to r/o PE  - plan for CT coronaries tomorrow  - was prescribed meloxicam by PCP to trial which he has not taken. for now will hold off on NSAIDs as to not exacerbate his GI/reflux symptoms

## 2021-10-18 NOTE — H&P ADULT - ASSESSMENT
31 yo male with PMH of COVID-19 infection in Jan 2021 who presents with subacute worsening of his is intermittent chest pain over last few weeks.

## 2021-10-18 NOTE — H&P ADULT - PROBLEM SELECTOR PLAN 6
DVT ppx: lovenox COVID-19 infection in Jan 2021.  Received 1st dose of COVID-19 vaccine in August but had 4 days of fever, chills, myalgias, thus did not receive 2nd dose.

## 2021-10-18 NOTE — H&P ADULT - HISTORY OF PRESENT ILLNESS
29 yo male with PMH of COVID-19 infection in Jan 2021 who presents with subacute worsening of his is intermittent chest pain over last few weeks. He states ever since his COVID-19 infection, he has not felt the same and has had intermittent sharp/burning intermittent chest pains along with GI upset. He describes the chest pain as sometimes sharp and sometimes burning both in his anterior left chest wall and anterior right chest wall, reproducible with palpation. Exacerbated with certain movements, positional changes, and sometimes after eating. His symptoms associated with subjective fever, migraine headaches, occasional lightheadedness, sore throat, nausea, dry heaving, and belching. Denies any recent sick contacts, cough, dyspnea, diarrhea, constipation, dysuria nor frequency. Of note, he recently had b/l earache for which he was taking doxycycline for since 10/12/21 (last dose this morning) with symptoms resolved. Patient was sent into the ED by his PCP for further evaluation.    In the ED, vitals unremarkable. Labs notable for leukocytosis to 18K, trop neg x 2, COVID-19 PCR negative. CXR with clear lungs. Admitted under Dr. Pang's service for further work-up.

## 2021-10-18 NOTE — ED PROVIDER NOTE - CLINICAL SUMMARY MEDICAL DECISION MAKING FREE TEXT BOX
30y male PMHx COVID-19 infection Jan 2021 p/w chest pain. vss on exam no acute findings. lungs cta, abd ntnd. anxious male. likely anxiety, panic disorder l;ow suspicion for pe, acs, ptx, dissection. will eval with labs and admit

## 2021-10-18 NOTE — H&P ADULT - NSHPSOCIALHISTORY_GEN_ALL_CORE
denies smoking  denies EtOH nor drug use  lives with wife and daughter  used to work as  but no longer able to work

## 2021-10-18 NOTE — ED ADULT NURSE NOTE - TEMPLATE LIST FOR HEAD TO TOE ASSESSMENT
18        Alma Rosa Velarde  [address] [address]          Patient:  Alma Rosa Velarde : 1965  MRN#: [id number]      Dear Alma Rosa:    We have been unable to reach you by phone.  Please contact the office at [phone] your earliest convenience to discuss OVERDUE LABS.      Sincerely,          NAIF Fajardo  [facility] CARDIOLOGY SERVICES  [address] [place] [id number] [phone]  
Cardiac

## 2021-10-18 NOTE — ED CLERICAL - NS ED CLERK NOTE PRE-ARRIVAL INFORMATION; ADDITIONAL PRE-ARRIVAL INFORMATION
CC/Reason For referral: Chest pain/Psych eval needed/hematology eval needed/CTA combo needed   Preferred Consultant(if applicable):  Who admits for you (if needed):  Do you have documents you would like to fax over?  Would you still like to speak to an ED attending? Yes

## 2021-10-18 NOTE — ED PROVIDER NOTE - ATTENDING CONTRIBUTION TO CARE
Patient is a 29 yo M with history of COVID19 in January 2021 here for chest discomfort for the past few weeks. Patient is worried he is going to die. He was seen 10/15/21 and had a negative work up including negative d-dimer and trop < 6. He is a non-smoker. He was sent in by Dr. Vazquez today for admission and evaluation by Dr. Pang. He denies fevers, chills. He reports feeling hot. He is worried he has an infection. No known DVT/PE's.     VS noted  Gen. no acute distress, Non toxic, anxious  HEENT: EOMI, mmm  Lungs: CTAB/L no C/ W /R   CVS: RRR   Abd; Soft non tender, non distended   Ext: no edema, no calf tenderness  Skin: no rash  Neuro AAOx3 non focal clear speech  a/p: chest pain - ekg wnl. plan for ACS work up, CTA chest as requested by Dr. Pang and admission.   - Carolynn LONGO

## 2021-10-18 NOTE — ED PROVIDER NOTE - OBJECTIVE STATEMENT
30y male PMHx COVID-19 infection Jan 2021 p/w chest pain. Pt reports intermittent chest pain x1 yr worse in the last few weeks. the CP is aching, reproducible with palpation of the chest wall, sometimes burning worse after eating. pt denies worse with exertion, position, or after eating. nonsmoker. pt reports since having covid in January he had had migraines, tactile fever, severe reflux, intermittent chest pain (feels different currently), and a lump in his neck. pt currently on abx for an ear infection. pt perseverates "I don't want to die. I hope its not COVID again"     denies SI/HI. Pt denies SOB, BENITES, extremity swelling, weight loss, recent travel, body aches, nausea, vomiting, diarrhea, or constipation. no known sick contacts.     Brought in from Dr. Vazquez's office for admission for cardiac testing and psych eval

## 2021-10-18 NOTE — H&P ADULT - PROBLEM SELECTOR PLAN 2
has history of persistent leukocytosis for which he had seen hematology for.  - leukocytosis to 18K today with left shift currently  - however, has had multiple infectious work-up performed that has been negative  - most recently has been taking doxy 100mg BID for ear ache since 10/12 (last dose this morning)  - since his ear ache/symptoms resolved, will hold doxycycline for now (has completed approx 7 day course now)  - check procal in AM  - check full RVP given his intermittent sore throat/rhinorrhea  - will check set of blood culture with AM labs  - hematology eval in morning

## 2021-10-18 NOTE — H&P ADULT - NSHPREVIEWOFSYSTEMS_GEN_ALL_CORE
CONSTITUTIONAL: No fever or fatigue, +weight loss  EYES: No eye pain, visual disturbances, or discharge  ENMT:  No difficulty hearing, tinnitus, vertigo; No sinus pain, +sore throat  NECK: No pain or stiffness  RESPIRATORY: No cough, wheezing, chills or hemoptysis; No shortness of breath  CARDIOVASCULAR: +reproducible b/l anterior chest wall pain, palpitations, dizziness, or leg swelling  GASTROINTESTINAL: No abdominal or epigastric pain. No nausea, vomiting, or hematemesis; No diarrhea or constipation. No melena or hematochezia.  GENITOURINARY: No dysuria, frequency, hematuria, or incontinence  NEUROLOGICAL: No headaches, memory loss, loss of strength, numbness, or tremors  SKIN: No itching, burning, rashes, or lesions   LYMPH NODES: No enlarged glands  ENDOCRINE: No heat or cold intolerance; No hair loss  MUSCULOSKELETAL: No joint pain or swelling; No muscle, back, or extremity pain  PSYCHIATRIC: No depression, mood swings, +difficulty sleeping + anxiety  HEME/LYMPH: No easy bruising, or bleeding gums

## 2021-10-18 NOTE — H&P ADULT - NSHPLABSRESULTS_GEN_ALL_CORE
Labs reviewed:                         15.2   18.40 )-----------( 346      ( 18 Oct 2021 15:57 )             45.5     10-18    138  |  102  |  10  ----------------------------<  85  3.8   |  24  |  1.10    Ca    9.8      18 Oct 2021 15:57  Mg     1.9     10-18    TPro  7.9  /  Alb  4.8  /  TBili  0.6  /  DBili  x   /  AST  21  /  ALT  37  /  AlkPhos  84  10-18        Imaging personally reviewed:  CXR with clear lungs    ECG reviewed and interpreted:   EKG with no ischemic changes

## 2021-10-18 NOTE — ED PROVIDER NOTE - PHYSICAL EXAMINATION
Vital signs reviewed  GENERAL: anxious male, pressured speech   HEAD: NCAT  EYES: Anicteric  ENT: MMM  NECK: Supple, non tender  RESPIRATORY: Normal respiratory effort. CTA B/L. No wheezing, rales, rhonchi  CARDIOVASCULAR: Regular rate and rhythm  ABDOMEN: Soft. Nondistended. Nontender. No guarding or rebound. No CVA tenderness.  MUSCULOSKELETAL/EXTREMITIES: Brisk cap refill. 2+ radial pulses. No leg edema.  SKIN:  Warm and dry  NEURO: AAOx3. No gross FND.  PSYCHIATRIC: anxious male, pressured speech

## 2021-10-18 NOTE — H&P ADULT - PROBLEM SELECTOR PLAN 5
COVID-19 infection in Jan 2021.  Received 1st dose of COVID-19 vaccine in August but had 4 days of fever, chills, myalgias, thus did not receive 2nd dose. takes omeprazole on and off. has been off it for few weeks now  - states had EGD that was unremarkable  - start pantoprazole 40mg daily  - maalox PRN

## 2021-10-19 ENCOUNTER — NON-APPOINTMENT (OUTPATIENT)
Age: 31
End: 2021-10-19

## 2021-10-19 DIAGNOSIS — F43.21 ADJUSTMENT DISORDER WITH DEPRESSED MOOD: ICD-10-CM

## 2021-10-19 LAB
ALBUMIN SERPL ELPH-MCNC: 4.6 G/DL — SIGNIFICANT CHANGE UP (ref 3.3–5)
ALP SERPL-CCNC: 83 U/L — SIGNIFICANT CHANGE UP (ref 40–120)
ALT FLD-CCNC: 34 U/L — SIGNIFICANT CHANGE UP (ref 10–45)
ANION GAP SERPL CALC-SCNC: 15 MMOL/L — SIGNIFICANT CHANGE UP (ref 5–17)
AST SERPL-CCNC: 21 U/L — SIGNIFICANT CHANGE UP (ref 10–40)
BASOPHILS # BLD AUTO: 0.05 K/UL — SIGNIFICANT CHANGE UP (ref 0–0.2)
BASOPHILS NFR BLD AUTO: 0.4 % — SIGNIFICANT CHANGE UP (ref 0–2)
BILIRUB SERPL-MCNC: 0.6 MG/DL — SIGNIFICANT CHANGE UP (ref 0.2–1.2)
BUN SERPL-MCNC: 13 MG/DL — SIGNIFICANT CHANGE UP (ref 7–23)
CALCIUM SERPL-MCNC: 9.7 MG/DL — SIGNIFICANT CHANGE UP (ref 8.4–10.5)
CHLORIDE SERPL-SCNC: 105 MMOL/L — SIGNIFICANT CHANGE UP (ref 96–108)
CO2 SERPL-SCNC: 22 MMOL/L — SIGNIFICANT CHANGE UP (ref 22–31)
COVID-19 SPIKE DOMAIN AB INTERP: POSITIVE
COVID-19 SPIKE DOMAIN ANTIBODY RESULT: >250 U/ML — HIGH
CREAT SERPL-MCNC: 1.06 MG/DL — SIGNIFICANT CHANGE UP (ref 0.5–1.3)
EOSINOPHIL # BLD AUTO: 0.09 K/UL — SIGNIFICANT CHANGE UP (ref 0–0.5)
EOSINOPHIL NFR BLD AUTO: 0.7 % — SIGNIFICANT CHANGE UP (ref 0–6)
GLUCOSE SERPL-MCNC: 82 MG/DL — SIGNIFICANT CHANGE UP (ref 70–99)
HCT VFR BLD CALC: 47.9 % — SIGNIFICANT CHANGE UP (ref 39–50)
HGB BLD-MCNC: 15.6 G/DL — SIGNIFICANT CHANGE UP (ref 13–17)
IMM GRANULOCYTES NFR BLD AUTO: 0.8 % — SIGNIFICANT CHANGE UP (ref 0–1.5)
LYMPHOCYTES # BLD AUTO: 2.9 K/UL — SIGNIFICANT CHANGE UP (ref 1–3.3)
LYMPHOCYTES # BLD AUTO: 23.8 % — SIGNIFICANT CHANGE UP (ref 13–44)
MAGNESIUM SERPL-MCNC: 2.4 MG/DL — SIGNIFICANT CHANGE UP (ref 1.6–2.6)
MCHC RBC-ENTMCNC: 29.3 PG — SIGNIFICANT CHANGE UP (ref 27–34)
MCHC RBC-ENTMCNC: 32.6 GM/DL — SIGNIFICANT CHANGE UP (ref 32–36)
MCV RBC AUTO: 90 FL — SIGNIFICANT CHANGE UP (ref 80–100)
MONOCYTES # BLD AUTO: 1.09 K/UL — HIGH (ref 0–0.9)
MONOCYTES NFR BLD AUTO: 8.9 % — SIGNIFICANT CHANGE UP (ref 2–14)
NEUTROPHILS # BLD AUTO: 7.98 K/UL — HIGH (ref 1.8–7.4)
NEUTROPHILS NFR BLD AUTO: 65.4 % — SIGNIFICANT CHANGE UP (ref 43–77)
NRBC # BLD: 0 /100 WBCS — SIGNIFICANT CHANGE UP (ref 0–0)
PHOSPHATE SERPL-MCNC: 4.8 MG/DL — HIGH (ref 2.5–4.5)
PLATELET # BLD AUTO: 319 K/UL — SIGNIFICANT CHANGE UP (ref 150–400)
POTASSIUM SERPL-MCNC: 4.3 MMOL/L — SIGNIFICANT CHANGE UP (ref 3.5–5.3)
POTASSIUM SERPL-SCNC: 4.3 MMOL/L — SIGNIFICANT CHANGE UP (ref 3.5–5.3)
PROCALCITONIN SERPL-MCNC: <0.03 NG/ML — SIGNIFICANT CHANGE UP (ref 0.02–0.1)
PROT SERPL-MCNC: 7.4 G/DL — SIGNIFICANT CHANGE UP (ref 6–8.3)
RBC # BLD: 5.32 M/UL — SIGNIFICANT CHANGE UP (ref 4.2–5.8)
RBC # FLD: 12.5 % — SIGNIFICANT CHANGE UP (ref 10.3–14.5)
SARS-COV-2 IGG+IGM SERPL QL IA: >250 U/ML — HIGH
SARS-COV-2 IGG+IGM SERPL QL IA: POSITIVE
SODIUM SERPL-SCNC: 142 MMOL/L — SIGNIFICANT CHANGE UP (ref 135–145)
WBC # BLD: 12.21 K/UL — HIGH (ref 3.8–10.5)
WBC # FLD AUTO: 12.21 K/UL — HIGH (ref 3.8–10.5)

## 2021-10-19 PROCEDURE — 99221 1ST HOSP IP/OBS SF/LOW 40: CPT

## 2021-10-19 PROCEDURE — 75574 CT ANGIO HRT W/3D IMAGE: CPT | Mod: 26

## 2021-10-19 PROCEDURE — 93010 ELECTROCARDIOGRAM REPORT: CPT

## 2021-10-19 PROCEDURE — 99222 1ST HOSP IP/OBS MODERATE 55: CPT

## 2021-10-19 RX ORDER — ALPRAZOLAM 0.25 MG
0.25 TABLET ORAL ONCE
Refills: 0 | Status: DISCONTINUED | OUTPATIENT
Start: 2021-10-19 | End: 2021-10-20

## 2021-10-19 RX ORDER — METOPROLOL TARTRATE 50 MG
50 TABLET ORAL ONCE
Refills: 0 | Status: COMPLETED | OUTPATIENT
Start: 2021-10-19 | End: 2021-10-19

## 2021-10-19 RX ORDER — INFLUENZA VIRUS VACCINE 15; 15; 15; 15 UG/.5ML; UG/.5ML; UG/.5ML; UG/.5ML
0.5 SUSPENSION INTRAMUSCULAR ONCE
Refills: 0 | Status: DISCONTINUED | OUTPATIENT
Start: 2021-10-19 | End: 2021-10-21

## 2021-10-19 RX ADMIN — PANTOPRAZOLE SODIUM 40 MILLIGRAM(S): 20 TABLET, DELAYED RELEASE ORAL at 06:15

## 2021-10-19 RX ADMIN — Medication 1 TABLET(S): at 13:34

## 2021-10-19 RX ADMIN — Medication 50 MILLIGRAM(S): at 10:47

## 2021-10-19 RX ADMIN — Medication 30 MILLILITER(S): at 14:09

## 2021-10-19 RX ADMIN — ENOXAPARIN SODIUM 40 MILLIGRAM(S): 100 INJECTION SUBCUTANEOUS at 13:22

## 2021-10-19 NOTE — PROGRESS NOTE ADULT - SUBJECTIVE AND OBJECTIVE BOX
Date of Service   10-19-21 @ 14:12    Patient is a 30y old  Male who presents with a chief complaint of chest pain (19 Oct 2021 13:22)      INTERVAL HISTORY:     TELEMETRY Personally reviewed:    REVIEW OF SYSTEMS:   CONSTITUTIONAL: No weakness  EYES/ENT: No visual changes; No throat pain  Neck: No pain or stiffness  Respiratory: No cough, wheezing, No shortness of breath  CARDIOVASCULAR: no chest pain or palpitations  GASTROINTESTINAL: No abdominal pain, no nausea, vomiting or hematemesis  GENITOURINARY: No dysuria, frequency or hematuria  NEUROLOGICAL: No stroke like symptoms  SKIN: No rashes    	  MEDICATIONS:        PHYSICAL EXAM:  T(C): 36.6 (10-19-21 @ 10:50), Max: 37.6 (10-18-21 @ 14:23)  HR: 58 (10-19-21 @ 12:31) (58 - 95)  BP: 126/79 (10-19-21 @ 12:31) (112/67 - 163/95)  RR: 25 (10-19-21 @ 12:31) (16 - 25)  SpO2: 97% (10-19-21 @ 12:31) (96% - 100%)  Wt(kg): --  I&O's Summary        Appearance: In no distress	  HEENT:    PERRL, EOMI	  Cardiovascular:  S1 S2, No JVD  Respiratory: Lungs clear to auscultation	  Gastrointestinal:  Soft, Non-tender, + BS	  Vascularature:  No edema of LE  Psychiatric: Appropriate affect   Neuro: no acute focal deficits                               15.6   12.21 )-----------( 319      ( 19 Oct 2021 07:18 )             47.9     10-19    142  |  105  |  13  ----------------------------<  82  4.3   |  22  |  1.06    Ca    9.7      19 Oct 2021 07:18  Phos  4.8     10-19  Mg     2.4     10-19    TPro  7.4  /  Alb  4.6  /  TBili  0.6  /  DBili  x   /  AST  21  /  ALT  34  /  AlkPhos  83  10-19        Labs personally reviewed    < from: CT Angio Chest PE Protocol w/ IV Cont (10.18.21 @ 18:15) >    Study degraded by suboptimal contrast bolus and respiratory motion. No filling defect in the pulmonary artery or its branches to the lobar level. Segmental and subsegmental vessels are not adequately evaluated.      < end of copied text >  < from: Xray Chest 2 Views PA/Lat (10.18.21 @ 16:41) >  MPRESSION:  Clear lungs.      < end of copied text >    ASSESSMENT/PLAN: 	    29 yo male with PMH of COVID-19 infection in Jan 2021 who presents with subacute worsening of his is intermittent chest pain over last few weeks.    Problem/Plan - 1:  ·  Problem: Atypical chest pain.   ·  Plan: less likely cardiac  - f/u CTA chest to r/o PE as noted above   - CT coronaries performed awaiting results     Problem/Plan - 2:  ·  Problem: Leukocytosis.   ·  Plan: has history of persistent leukocytosis for which he had seen hematology for.  - leukocytosis decreased  to 12K today  - +parainfluzenza  - check procal    - hematology eval - Dr Fday Servin   - ID eval appreciated   - supportive care     Problem/Plan - 3:  ·  Problem: Anxiety.  ·  Plan: very anxious on exam. several times pausing to ask "am I going to die?" or "please tell me I'm not going to die" for reassurance.  - was prescribed escitalopram 5mg daily by his PCP recently but states he has not been taking it as he had adverse side effects from another medication (cymbalta)  - Psychiatry eval this am    Problem/Plan - 4:  ·  Problem: GERD (gastroesophageal reflux disease).  ·  Plan: takes omeprazole on and off. has been off it for few weeks now  - states had EGD that was unremarkable  - start pantoprazole 40mg daily  - maalox PRN.- GI eval Dr Stiles    Problem/Plan - 5:  ·  Problem: Prophylactic measure.   ·  Plan: DVT ppx: lovenox      Stanley Santana [facility]   Lucas Pang DO [facility]  Cardiovascular Medicine  [address], [address]  Office: [phone]  Cell: [phone]

## 2021-10-19 NOTE — BEHAVIORAL HEALTH ASSESSMENT NOTE - NSBHCHARTREVIEWVS_PSY_A_CORE FT
Vital Signs (24 Hrs):  T(C): 36.6 (10-19-21 @ 10:50), Max: 37.6 (10-18-21 @ 14:23)  HR: 91 (10-19-21 @ 10:50) (78 - 95)  BP: 128/83 (10-19-21 @ 10:50) (112/67 - 163/95)  RR: 18 (10-19-21 @ 10:50) (16 - 18)  SpO2: 100% (10-19-21 @ 10:50) (96% - 100%)  Wt(kg): --  Daily Height in cm: 175.26 (18 Oct 2021 13:25)    Daily

## 2021-10-19 NOTE — BEHAVIORAL HEALTH ASSESSMENT NOTE - DOMICILED WITH
HPI:    65 yo M with DM on insulin, CAD s/p 9 stents, HTN, s/p lap samira 10 years, GERD, former smoker ago who presents with jaundice. Pt had decreased po intake since March, associated with 15 pound weight loss. Yesterday, family noticed that he was jaundice.     Last colonoscopy - Tubular adenoma, diverticulosis    Allergies    Cipro (Rash)  Plavix (Rash)    Intolerances      Home Medications:  amLODIPine 10 mg oral tablet: 1 tab(s) orally 3 times a day (2019 12:37)  aspirin 81 mg oral tablet: 1 tab(s) orally once a day (2019 12:33)  HumaLOG 100 units/mL subcutaneous solution:  (2019 12:37)  valsartan 320 mg oral tablet: 1 tab(s) orally 3 times a day (2019 12:38)  Victoza 18 mg/3 mL subcutaneous solution: 1.8 milligram(s) subcutaneous once a day (2019 12:34)  Xultophy 100 units-3.6 mg/mL subcutaneous solution:  (2019 12:34)    MEDICATIONS:  MEDICATIONS  (STANDING):    MEDICATIONS  (PRN):    PAST MEDICAL & SURGICAL HISTORY:  cholecystectomy     FAMILY HISTORY:    Father ( )- metastatic prostate cancer  Mother ( )-- PE  Sister, brother- HTN  Brother ( )-- CVA        SOCIAL HISTORY:  Tobacoo: former, 30 pack year  Alcohol: denies  Illicit Drugs: denies    REVIEW OF SYSTEMS:  CONSTITUTIONAL: No weakness, fevers or chills  HEENT: No visual changes; No vertigo or throat pain   NECK: No pain or stiffness  RESPIRATORY: No cough, wheezing, hemoptysis; No shortness of breath  CARDIOVASCULAR: No chest pain or palpitations  GASTROINTESTINAL: No abdominal or epigastric pain. No nausea, vomiting, or hematemesis; No diarrhea or constipation. No melena or hematochezia.  GENITOURINARY: No dysuria, frequency or hematuria  NEUROLOGICAL: No numbness or weakness  SKIN: No itching, burning, rashes, or lesions   All other 10 review of systems is negative unless indicated above.    Vital Signs Last 24 Hrs  T(C): 36.8 (2019 12:18), Max: 37 (2019 11:26)  T(F): 98.2 (2019 12:18), Max: 98.6 (2019 11:26)  HR: 61 (2019 12:18) (61 - 63)  BP: 146/73 (2019 12:18) (131/74 - 146/73)  BP(mean): --  RR: 18 (2019 12:18) (16 - 18)  SpO2: 97% (2019 12:18) (97% - 98%)      PHYSICAL EXAM:    General: Well developed; well nourished; in no acute distress  Eyes: Anicteric sclerae, moist conjunctivae  HENT: Moist mucous membranes  Neck: Trachea midline, supple  Lungs: Normal respiratory effors and no intercostal retractions  Cardiovascular: RRR  Abdomen: Soft, non-tender non-distended; Normal bowel sounds; No rebound or guarding  Extremities: Normal range of motion, No clubbing, cyanosis or edema  Neurological: Alert and oriented x3  Skin: Warm and dry. No obvious rash    LABS:        136  |  102  |  51<H>  ----------------------------<  135<H>  5.0   |  19<L>  |  4.20<H>    Ca    10.0      2019 11:48    TPro  8.4<H>  /  Alb  3.6  /  TBili  8.7<H>  /  DBili  6.4<H>  /  AST  272<H>  /  ALT  301<H>  /  AlkPhos  841<H>              RADIOLOGY & ADDITIONAL STUDIES: HPI:    63 yo M with DM on insulin, CAD s/p 9 stents ( ), HTN, s/p lap samira ( ), GERD, former smoker ago who presents with jaundice. Pt reports early satiety since March, associated with 15 pound weight loss. Denies abd pain. Reports intermittent nausea, but no vomiting. Over the past week, he has noticed ifeanyi colored stool, dark urine, pruritus. Yesterday, family noticed that he was jaundice. No FH of GI malignancy. Former smoker, quit 30 years ago.     Last colonoscopy - Tubular adenoma, diverticulosis    Allergies    Cipro (Rash)  Plavix (Rash)    Intolerances      Home Medications:  amLODIPine 10 mg oral tablet: 1 tab(s) orally 3 times a day (2019 12:37)  aspirin 81 mg oral tablet: 1 tab(s) orally once a day (2019 12:33)  HumaLOG 100 units/mL subcutaneous solution:  (2019 12:37)  valsartan 320 mg oral tablet: 1 tab(s) orally 3 times a day (2019 12:38)  Victoza 18 mg/3 mL subcutaneous solution: 1.8 milligram(s) subcutaneous once a day (2019 12:34)  Xultophy 100 units-3.6 mg/mL subcutaneous solution:  (2019 12:34)    MEDICATIONS:  MEDICATIONS  (STANDING):    MEDICATIONS  (PRN):    PAST MEDICAL & SURGICAL HISTORY:  cholecystectomy     FAMILY HISTORY:    Father ( )- metastatic prostate cancer  Mother ( )-- PE  Sister, brother- HTN  Brother ( )-- CVA        SOCIAL HISTORY:  Tobacoo: former, 30 pack year  Alcohol: denies  Illicit Drugs: denies    REVIEW OF SYSTEMS:  CONSTITUTIONAL: No weakness, fevers or chills  HEENT: No visual changes; No vertigo or throat pain   NECK: No pain or stiffness  RESPIRATORY: No cough, wheezing, hemoptysis; No shortness of breath  CARDIOVASCULAR: No chest pain or palpitations  GASTROINTESTINAL: No abdominal or epigastric pain. No nausea, vomiting, or hematemesis; No diarrhea or constipation. No melena or hematochezia.  GENITOURINARY: No dysuria, frequency or hematuria  NEUROLOGICAL: No numbness or weakness  SKIN: No itching, burning, rashes, or lesions   All other 10 review of systems is negative unless indicated above.    Vital Signs Last 24 Hrs  T(C): 36.8 (2019 12:18), Max: 37 (2019 11:26)  T(F): 98.2 (2019 12:18), Max: 98.6 (2019 11:26)  HR: 61 (2019 12:18) (61 - 63)  BP: 146/73 (2019 12:18) (131/74 - 146/73)  BP(mean): --  RR: 18 (2019 12:18) (16 - 18)  SpO2: 97% (2019 12:18) (97% - 98%)      PHYSICAL EXAM:    General: Well developed; well nourished; in no acute distress  Eyes: Anicteric sclerae, moist conjunctivae  HENT: Moist mucous membranes  Neck: Trachea midline, supple  Lungs: Normal respiratory effors and no intercostal retractions  Cardiovascular: RRR  Abdomen: Soft, non-tender non-distended; Normal bowel sounds; No rebound or guarding  Extremities: Normal range of motion, No clubbing, cyanosis or edema  Neurological: Alert and oriented x3  Skin: Warm and dry. No obvious rash    LABS:        136  |  102  |  51<H>  ----------------------------<  135<H>  5.0   |  19<L>  |  4.20<H>    Ca    10.0      2019 11:48    TPro  8.4<H>  /  Alb  3.6  /  TBili  8.7<H>  /  DBili  6.4<H>  /  AST  272<H>  /  ALT  301<H>  /  AlkPhos  841<H>              RADIOLOGY & ADDITIONAL STUDIES: Family

## 2021-10-19 NOTE — BEHAVIORAL HEALTH ASSESSMENT NOTE - DETAILS
no history of SI Patient states he was disoriented and dizzy while on the medication which resolved once he stopped it. Mother has depression

## 2021-10-19 NOTE — BEHAVIORAL HEALTH ASSESSMENT NOTE - PROBLEM SELECTOR PLAN 1
The patient has anxiety which has been treated with Cymbalta in the past, however, the patient discontinued the medication due to side effects. The patient should receive outpatient follow up with a psychiatrist for his anxiety and would benefit from beginning an SSRI such as Paroxetine 20 mg daily. In order to treat his acute anxiety, the patient would benefit from .5 mg of Ativan.

## 2021-10-19 NOTE — PROVIDER CONTACT NOTE (OTHER) - SITUATION
EKG done provider aware, pending CT angio heart
provider aware and reports psych eval pending
Patient c/o abdominal pain 6/10 and midsternal chest pain 4/10

## 2021-10-19 NOTE — BEHAVIORAL HEALTH ASSESSMENT NOTE - NSBHCHARTREVIEWINVESTIGATE_PSY_A_CORE FT
< from: 12 Lead ECG (10.15.21 @ 10:27) >      Ventricular Rate 86 BPM    Atrial Rate 86 BPM    P-R Interval 146 ms    QRS Duration 90 ms    Q-T Interval 348 ms    QTC Calculation(Bazett) 416 ms    P Axis 11 degrees    R Axis 34 degrees    T Axis 45 degrees    Diagnosis Line SINUS RHYTHM WITH MARKED SINUS ARRHYTHMIA  OTHERWISE NORMAL ECG  NO PREVIOUS ECGS AVAILABLE  Confirmed by MD Nabor, Talha ([id number]) on 10/16/2021 4:30:51 PM    < end of copied text >

## 2021-10-19 NOTE — CHART NOTE - NSCHARTNOTEFT_GEN_A_CORE
MEDICINE NP    DESI MCNAMARA  30y Male    Patient is a 30y old  Male who presents with a chief complaint of chest pain (19 Oct 2021 14:12)       > Event Summary:  Notified by RN, Patient c/o abdominal pain and chest pain. VS stable. NAD noted. Pt seen and evaluated at bedside. Pt describes intermittent 7/10 bilateral upper chest pain that is described as soreness as well as abdominal discomfort that is described as bubbling and is        -Vital Signs Last 24 Hrs  T(C): 37.1 (19 Oct 2021 19:26), Max: 37.1 (19 Oct 2021 19:26)  T(F): 98.8 (19 Oct 2021 19:26), Max: 98.8 (19 Oct 2021 19:26)  HR: 77 (19 Oct 2021 19:26) (58 - 91)  BP: 137/92 (19 Oct 2021 19:26) (126/79 - 147/96)  BP(mean): --  RR: 18 (19 Oct 2021 19:26) (18 - 25)  SpO2: 97% (19 Oct 2021 19:26) (97% - 100%)    > Physical Assessment:  General: Awake, No acute distress.   Neurology: A&Ox3, nonfocal  CV: +S1S2, RRR.  No peripheral edema  Respiratory: Even, unlabored.  CTA B/L.    Abdomen:  +BS.  Soft, NT, ND.  No palpable mass  MSK: ERWIN x4.   Skin: Warm and Dry         > Assessment & Plan:  - HPI:     -Plan:             Adina CAMPBELL #  Department of Medicine MEDICINE NP    DESI MCNAMARA  30y Male    Patient is a 30y old  Male who presents with a chief complaint of chest pain (19 Oct 2021 14:12)       > Event Summary:  Notified by RN, Patient c/o abdominal pain and chest pain. VS stable. Afebrile. RA. NAD noted. Pt seen and evaluated at bedside.  AOX4.  Pt states he has been using his own pulse oximeter while hospitalized and is concerned that his 0xygen saturation level has been reading 97%, a decrease from 99% at home. He is c/o  intermittent 7/10 bilateral upper chest pain that is described as soreness, reproducible,  no change in intensity with positioning, as well as abdominal discomfort that is described as bubbling. Denies coughing, palpitations, shortness of breath, nausea, vomiting.         -Vital Signs Last 24 Hrs  T(C): 37.1 (19 Oct 2021 19:26), Max: 37.1 (19 Oct 2021 19:26)  T(F): 98.8 (19 Oct 2021 19:26), Max: 98.8 (19 Oct 2021 19:26)  HR: 77 (19 Oct 2021 19:26) (58 - 91)  BP: 137/92 (19 Oct 2021 19:26) (126/79 - 147/96)  BP(mean): --  RR: 18 (19 Oct 2021 19:26) (18 - 25)  SpO2: 97% (19 Oct 2021 19:26) (97% - 100%)    > Physical Assessment:  General: Awake, No acute distress.   Neurology: A&Ox3, anxious   CV: +S1S2, RRR.  No peripheral edema  Respiratory: Even, unlabored.  CTA B/L.    Abdomen:  +BS.  Soft, ND.  No palpable mass  MSK: ERWIN x4.   Skin: Warm and Dry         > Assessment & Plan:  - HPI:     -Plan:   -Repeat ECG-> pt refused  -Maalox-> pt refused  -          Adina CAMPBELL #  Department of Medicine MEDICINE NP    DESI MCNAMARA  30y Male    Patient is a 30y old  Male who presents with a chief complaint of chest pain (19 Oct 2021 14:12)       > Event Summary:  Notified by RN, Patient c/o abdominal pain and chest pain. VS stable. Afebrile. RA. NAD noted. Pt seen and evaluated at bedside.  AOX4.  Pt states he has been using his own pulse oximeter while hospitalized and is concerned that his 0xygen saturation level has been reading 97%, a decrease from 99% at home. He is c/o  intermittent 7/10 bilateral upper chest pain that is described as soreness, reproducible,  no change in intensity with positioning, as well as abdominal discomfort that is described as bubbling. Denies coughing, palpitations, shortness of breath, nausea, vomiting.         -Vital Signs Last 24 Hrs  T(C): 37.1 (19 Oct 2021 19:26), Max: 37.1 (19 Oct 2021 19:26)  T(F): 98.8 (19 Oct 2021 19:26), Max: 98.8 (19 Oct 2021 19:26)  HR: 77 (19 Oct 2021 19:26) (58 - 91)  BP: 137/92 (19 Oct 2021 19:26) (126/79 - 147/96)  BP(mean): --  RR: 18 (19 Oct 2021 19:26) (18 - 25)  SpO2: 97% (19 Oct 2021 19:26) (97% - 100%)    > Physical Assessment:  General: Awake, No acute distress.   Neurology: A&Ox3, anxious   CV: +S1S2, RRR.  No peripheral edema  Respiratory: Even, unlabored.  CTA B/L.    Abdomen:  +BS.  Soft, ND.  No palpable mass  MSK: ERWIN x4.   Skin: Warm and Dry         > Assessment & Plan:  - HPI:     -Plan:   -Cards following  -CTA-  -Troponins   -Repeat ECG  -CXR-> clear lungs  -Maalox  -Tylenol  -Xanax           Adina MURRAYP #  Department of Medicine

## 2021-10-19 NOTE — CONSULT NOTE ADULT - SUBJECTIVE AND OBJECTIVE BOX
Patient is a 30y old  Male who presents with a chief complaint of chest pain (18 Oct 2021 18:49)      HPI:  31 yo male with PMH of COVID-19 infection in Jan 2021 who presents with subacute worsening of his is intermittent chest pain over last few weeks. He states ever since his COVID-19 infection, he has not felt the same and has had intermittent sharp/burning intermittent chest pains along with GI upset. He describes the chest pain as sometimes sharp and sometimes burning both in his anterior left chest wall and anterior right chest wall, reproducible with palpation. Exacerbated with certain movements, positional changes, and sometimes after eating. His symptoms associated with subjective fever, migraine headaches, occasional lightheadedness, sore throat, nausea, dry heaving, and belching. Denies any recent sick contacts, cough, dyspnea, diarrhea, constipation, dysuria nor frequency. Of note, he recently had b/l earache for which he was taking doxycycline for since 10/12/21 (last dose this morning) with symptoms resolved. Patient was sent into the ED by his PCP for further evaluation.    In the ED, vitals unremarkable. Labs notable for leukocytosis to 18K, trop neg x 2, COVID-19 PCR negative. CXR with clear lungs. Admitted under Dr. Pang's service for further work-up.  (18 Oct 2021 18:49)  ADDITIONAL HISTORY pt notes that he doesn't know where he was exposed to the parainfluenza  He claims that he doesn't leave house and  stays in bed most of the day. He notes that his 1/1/1 yo child does not go to . His wife is OT in a school. Pt claims he goes to doctor appointments almost every day. Pt had trouble pinpointing when things became worse . He came to ER though for chest pain, reproducible with touching chest. He denies diarrhea or urinary sxs. He notes chronic throat pain. He notes bifrontal headache. He claims his weight has gone from 296--> 235 pounds.  Pt kept asking if he was going to die and then would cry saying that he has a little baby at home.  Pt claims that his taste is normal but he has no appetite and that his stomach hurts him.  Pt was upset that examiner was leaving. WIll you come back? I want to be home for my birthday and cried again      PAST MEDICAL & SURGICAL HISTORY:  COVID-19 Jan 2021    No significant past surgical history        Social history:     Marital Status:   Occupation: - unable to work since [place]  Lives with: wife    Substance Use : denies  Tobacco Usage:  (  x ) never smoked   (   ) former smoker   (   ) current smoker  (     ) pack year  (        ) last tobacco use date  Alcohol Usage: denies  Travel: denies  Pets: denies          FAMILY HISTORY:      REVIEW OF SYSTEMS  General:	Denies any malaise fatigue or chills. Fevers absent    Skin:No rash  	  Ophthalmologic:Denies any visual complaints,discharge redness or photophobia  	  ENMT:No nasal discharge,headache,sinus congestion or throat pain.No dental complaints    Respiratory and Thorax:No cough,sputum or chest pain.Denies shortness of breath  	  Cardiovascular:	No chest pain,palpitaions or dizziness    Gastrointestinal:	NO nausea,abdominal pain or diarrhea.    Genitourinary:	No dysuria,frequency. No flank pain    Musculoskeletal:	No joint swelling or pain.No weakness    Neurological:No confusion,diziness.No extremity weakness.No bladder or bowel incontinence	    Psychiatric:No delusions or hallucinations	    Hematology/Lymphatics:	No LN swelling.No gum bleeding     Endocrine:	No recent weight gain or loss.No abnormal heat/cold intolerance    Allergic/Immunologic:	No hives or rash     Allergies    No Known Allergies    Intolerances        Antimicrobials:       MEDICATIONS  (prior antimicrobials ):                 MEDICATIONS  (STANDING):  enoxaparin Injectable 40 milliGRAM(s) SubCutaneous daily  lactobacillus acidophilus 1 Tablet(s) Oral daily  pantoprazole    Tablet 40 milliGRAM(s) Oral before breakfast    MEDICATIONS  (PRN):  acetaminophen   Tablet .. 650 milliGRAM(s) Oral every 6 hours PRN Temp greater or equal to 38C (100.4F), Mild Pain (1 - 3)  aluminum hydroxide/magnesium hydroxide/simethicone Suspension 30 milliLiter(s) Oral every 4 hours PRN Dyspepsia  melatonin 3 milliGRAM(s) Oral at bedtime PRN Insomnia  ondansetron Injectable 4 milliGRAM(s) IV Push every 8 hours PRN Nausea and/or Vomiting        Vital Signs Last 24 Hrs  T(C): 36.6 (19 Oct 2021 10:50), Max: 37.6 (18 Oct 2021 14:23)  T(F): 97.8 (19 Oct 2021 10:50), Max: 99.7 (18 Oct 2021 14:23)  HR: 58 (19 Oct 2021 12:31) (58 - 95)  BP: 126/79 (19 Oct 2021 12:31) (112/67 - 163/95)  BP(mean): --  RR: 25 (19 Oct 2021 12:31) (16 - 25)  SpO2: 97% (19 Oct 2021 12:31) (96% - 100%)    PHYSICAL EXAM:Pleasant patient in no acute distress.      Constitutional:Comfortable.Awake and alert  No cachexia     Eyes:PERRL EOMI.NO discharge or conjunctival injection    ENMT:No sinus tenderness.No thrush.No pharyngeal exudate or erythema.Fair dental hygiene    Neck:Supple,No LN,no JVD      Respiratory:Good air entry bilaterally,CTA    Cardiovascular:S1 S2 wnl, No murmurs,rub or gallops    Gastrointestinal:Soft BS(+) no tenderness no masses ,No rebound or guarding    Genitourinary:No CVA tendereness     Rectal:    Extremities:No cyanosis,clubbing or edema.    Vascular:peripheral pulses felt    Neurological:AAO X 3,No grossly focal deficits    Skin:No rash     Lymph Nodes:No palpable LNs    Musculoskeletal:No joint swelling or LOM    Psychiatric:Affect normal.                                15.6   12.21 )-----------( 319      ( 19 Oct 2021 07:18 )             47.9     LIVER FUNCTIONS - ( 19 Oct 2021 07:18 )  Alb: 4.6 g/dL / Pro: 7.4 g/dL / ALK PHOS: 83 U/L / ALT: 34 U/L / AST: 21 U/L / GGT: x             10-19    142  |  105  |  13  ----------------------------<  82  4.3   |  22  |  1.06    Ca    9.7      19 Oct 2021 07:18  Phos  4.8     10-19  Mg     2.4     10-19    TPro  7.4  /  Alb  4.6  /  TBili  0.6  /  DBili  x   /  AST  21  /  ALT  34  /  AlkPhos  83  10-19              RECENT CULTURES:      D-Dimer Assay, Quantitative: <150 ng/mL DDU (10-15)                MICROBIOLOGY:    Respiratory Viral Panel with COVID-19 by ASHLEY (10.18.21 @ 20:57)    Rapid RVP Result: Detected    SARS-CoV-2: NotDete: This Respiratory Panel uses polymerase chain reaction (PCR) to detect for  adenovirus; coronavirus (HKU1, NL63, 229E, OC43); human metapneumovirus  (hMPV); human enterovirus/rhinovirus (Entero/RV); influenza A; influenza  A/H1; influenza A/H3; influenza A/H1-2009; influenza B; parainfluenza  viruses 1, 2, 3, 4; respiratory syncytial virus; Mycoplasma pneumoniae;  Chlamydophila pneumoniae; and SARS-CoV-2.  Respiratory Viral Panel with COVID-19 by ASHLEY (10.18.21 @ 20:57)    Rapid RVP Result: Detected  Parainfluenza 2 (RapRVP): Detected (10.18.21 @ 20:57)                  Radiology:         Patient is a 30y old  Male who presents with a chief complaint of chest pain (18 Oct 2021 18:49)      HPI:  29 yo male with PMH of COVID-19 infection in Jan 2021 who presents with subacute worsening of his is intermittent chest pain over last few weeks. He states ever since his COVID-19 infection, he has not felt the same and has had intermittent sharp/burning intermittent chest pains along with GI upset. He describes the chest pain as sometimes sharp and sometimes burning both in his anterior left chest wall and anterior right chest wall, reproducible with palpation. Exacerbated with certain movements, positional changes, and sometimes after eating. His symptoms associated with subjective fever, migraine headaches, occasional lightheadedness, sore throat, nausea, dry heaving, and belching. Denies any recent sick contacts, cough, dyspnea, diarrhea, constipation, dysuria nor frequency. Of note, he recently had b/l earache for which he was taking doxycycline for since 10/12/21 (last dose this morning) with symptoms resolved. Patient was sent into the ED by his PCP for further evaluation.    In the ED, vitals unremarkable. Labs notable for leukocytosis to 18K, trop neg x 2, COVID-19 PCR negative. CXR with clear lungs. Admitted under Dr. Pang's service for further work-up.  (18 Oct 2021 18:49)  ADDITIONAL HISTORY pt notes that he doesn't know where he was exposed to the parainfluenza  He claims that he doesn't leave house and  stays in bed most of the day. He notes that his 1/1/3 yo child does not go to . His wife is OT in a school. Pt claims he goes to doctor appointments almost every day. Pt had trouble pinpointing when things became worse . He came to ER though for chest pain, reproducible with touching chest. He denies diarrhea or urinary sxs. He notes chronic throat pain. He notes bifrontal headache. He claims his weight has gone from 296--> 235 pounds.  Pt kept asking if he was going to die and then would cry saying that he has a little baby at home.  Pt claims that his taste is normal but he has no appetite and that his stomach hurts him.  Pt was upset that examiner was leaving. WIll you come back? I want to be home for my birthday and cried again. Ears were bothering patient but not any more. Pt notes he showers twice a day and uses Q tips. Pt notes some sinus dripping      PAST MEDICAL & SURGICAL HISTORY:  COVID-19 Jan 2021    No significant past surgical history        Social history:     Marital Status:   Occupation: - unable to work since [place]  Lives with: wife    Substance Use : denies  Tobacco Usage:  (  x ) never smoked   (   ) former smoker   (   ) current smoker  (     ) pack year  (        ) last tobacco use date  Alcohol Usage: denies  Travel: denies  Pets: denies          FAMILY HISTORY:  mother - htn and psoriatic arthritis  father with htn    REVIEW OF SYSTEMS  General:	subjective fevers    Skin:No rash  	  Ophthalmologic:Denies any visual complaints,discharge redness or photophobia  	  ENMT: some ear pain that seems to have resolved and chronic throat pain    Respiratory and Thorax: chest pain reproducible with touching and no SOB  	  Cardiovascular:	 no exertional CP  no pleuritic CP    Gastrointestinal:	mid epigastric  abd pain  no diarrhea  Genitourinary:	No dysuria,frequency. No flank pain    Musculoskeletal:	No joint swelling or pain.No weakness    Neurological:No confusion,diziness.No extremity weakness.No bladder or bowel incontinence	    Psychiatric: anxiety   Hematology/Lymphatics:	No LN swelling.No gum bleeding   Patient had seen a hematologist for an elevated WBC    Endocrine:	 weight loss    Allergic/Immunologic:	No hives or rash     Allergies    No Known Allergies    Intolerances        Antimicrobials:       MEDICATIONS  (prior antimicrobials ):                 MEDICATIONS  (STANDING):  enoxaparin Injectable 40 milliGRAM(s) SubCutaneous daily  lactobacillus acidophilus 1 Tablet(s) Oral daily  pantoprazole    Tablet 40 milliGRAM(s) Oral before breakfast    MEDICATIONS  (PRN):  acetaminophen   Tablet .. 650 milliGRAM(s) Oral every 6 hours PRN Temp greater or equal to 38C (100.4F), Mild Pain (1 - 3)  aluminum hydroxide/magnesium hydroxide/simethicone Suspension 30 milliLiter(s) Oral every 4 hours PRN Dyspepsia  melatonin 3 milliGRAM(s) Oral at bedtime PRN Insomnia  ondansetron Injectable 4 milliGRAM(s) IV Push every 8 hours PRN Nausea and/or Vomiting        Vital Signs Last 24 Hrs  T(C): 36.6 (19 Oct 2021 10:50), Max: 37.6 (18 Oct 2021 14:23)  T(F): 97.8 (19 Oct 2021 10:50), Max: 99.7 (18 Oct 2021 14:23)  HR: 58 (19 Oct 2021 12:31) (58 - 95)  BP: 126/79 (19 Oct 2021 12:31) (112/67 - 163/95)  BP(mean): --  RR: 25 (19 Oct 2021 12:31) (16 - 25)  SpO2: 97% (19 Oct 2021 12:31) (96% - 100%)    PHYSICAL EXAM: pt very anxious  kept on looking at monitor and asking about the numbers.  kept interrupting to ask if he was going to die       Constitutional:Comfortable.Awake and alert  No cachexia      Eyes:PERRL EOMI.NO discharge or conjunctival injection      ENMT:No sinus tenderness.No thrush.No pharyngeal exudate or erythema.Fair dental hygiene  visible tonsils    Neck:Supple,No LN,no JVD      Respiratory:Good air entry bilaterally,CTA    Cardiovascular:S1 S2     chest pain reproduced if touch chest wall     Gastrointestinal:Soft BS(+) no tenderness     Genitourinary:No CVA tendereness     Extremities:No cyanosis,clubbing or edema. toe nails not groomed     Vascular:peripheral pulses felt    Neurological:AAO X 3,No grossly focal deficits    Skin:No rash     Lymph Nodes:No palpable LNs    Musculoskeletal:No joint swelling or LOM    Psychiatric:  anxious, depressed                                15.6   12.21 )-----------( 319      ( 19 Oct 2021 07:18 )             47.9   Complete Blood Count + Automated Diff (10.19.21 @ 07:18)    WBC Count: 12.21 K/uL    RBC Count: 5.32 M/uL    Hemoglobin: 15.6 g/dL    Hematocrit: 47.9 %    Mean Cell Volume: 90.0 fl    Mean Cell Hemoglobin: 29.3 pg    Mean Cell Hemoglobin Conc: 32.6 gm/dL    Red Cell Distrib Width: 12.5 %    Platelet Count - Automated: 319 K/uL    Auto Neutrophil #: 7.98 K/uL    Auto Lymphocyte #: 2.90 K/uL    Auto Monocyte #: 1.09 K/uL    Auto Eosinophil #: 0.09 K/uL    Auto Basophil #: 0.05 K/uL    Auto Neutrophil %: 65.4: Differential percentages must be correlated with absolute numbers for  clinical significance. %    Auto Lymphocyte %: 23.8 %    Auto Monocyte %: 8.9 %    Auto Eosinophil %: 0.7 %    Auto Basophil %: 0.4 %    Auto Immature Granulocyte %: 0.8: (Includes meta, myelo and promyelocytes) %    Nucleated RBC: 0 /100 WBCs      LIVER FUNCTIONS - ( 19 Oct 2021 07:18 )  Alb: 4.6 g/dL / Pro: 7.4 g/dL / ALK PHOS: 83 U/L / ALT: 34 U/L / AST: 21 U/L / GGT: x           Thyroid Stimulating Hormone, Serum (10.15.21 @ 14:36)    Thyroid Stimulating Hormone, Serum: 0.76 uIU/mL    T4, Serum (10.15.21 @ 14:36)    T4, Serum: 7.9 ug/dL        10-19    142  |  105  |  13  ----------------------------<  82  4.3   |  22  |  1.06    Ca    9.7      19 Oct 2021 07:18  Phos  4.8     10-19  Mg     2.4     10-19    TPro  7.4  /  Alb  4.6  /  TBili  0.6  /  DBili  x   /  AST  21  /  ALT  34  /  AlkPhos  83  10-19      < from: CT Angio Chest PE Protocol w/ IV Cont (10.18.21 @ 18:15) >    EXAM:  CT ANGIO CHEST PULM [facility]                            PROCEDURE DATE:  10/18/2021            INTERPRETATION:  CLINICAL INFORMATION: Chest pain and shortness of breath. Clinical concern for pulmonary embolism.    COMPARISON: None.    CONTRAST/COMPLICATIONS:  IV Contrast: Omnipaque 350  75 cc administered   25 cc discarded  Oral Contrast: None  Complications: None    PROCEDURE:  CT Angiography of the Chest.  Sagittal and coronal reformats were performed as well as 3D (MIP) reconstructions.    FINDINGS:    LUNGS AND AIRWAYS: Evaluation degraded by respiratory motion. Patent central airways.  No lung consolidation.  PLEURA: No pleural effusion.  MEDIASTINUM AND DEJA: No lymphadenopathy.  VESSELS: Evaluation of the segmental and subsegmental pulmonary arteries is limited secondary to poor contrast opacification and respiratory motion.No filling defect in the pulmonary artery or its branches to the lobar level.  HEART: Heart size is normal. No pericardial effusion.  CHEST WALL AND LOWER NECK: Within normal limits.  VISUALIZED UPPER ABDOMEN: Within normal limits.  BONES: No aggressive bone lesions.    IMPRESSION:    Study degraded by suboptimal contrast bolus and respiratory motion. No filling defect in the pulmonary artery or its branches to the lobar level. Segmental and subsegmental vessels are not adequately evaluated.    --- End of Report ---              FILI ROCHA MD; Resident Radiology  This document has been electronically signed.   PASCALE PARMAR M.D., Attending Radiologist  This document has been electronically signed. Oct 19 2021  8:30AM    < end of copied text >          RECENT CULTURES:      D-Dimer Assay, Quantitative: <150 ng/mL DDU (10-15)                MICROBIOLOGY:    Respiratory Viral Panel with COVID-19 by ASHLEY (10.18.21 @ 20:57)    Rapid RVP Result: Detected    SARS-CoV-2: NotDetec: This Respiratory Panel uses polymerase chain reaction (PCR) to detect for  adenovirus; coronavirus (HKU1, NL63, 229E, OC43); human metapneumovirus  (hMPV); human enterovirus/rhinovirus (Entero/RV); influenza A; influenza  A/H1; influenza A/H3; influenza A/H1-2009; influenza B; parainfluenza  viruses 1, 2, 3, 4; respiratory syncytial virus; Mycoplasma pneumoniae;  Chlamydophila pneumoniae; and SARS-CoV-2.  Respiratory Viral Panel with COVID-19 by ASHLEY (10.18.21 @ 20:57)    Rapid RVP Result: Detected  Parainfluenza 2 (RapRVP): Detected (10.18.21 @ 20:57)            < from: Xray Chest 2 Views PA/Lat (10.18.21 @ 16:41) >  EXAM:  XR CHEST PA LAT 2V                            PROCEDURE DATE:  10/18/2021            INTERPRETATION:  CLINICAL INDICATION: Chest Pain    TECHNIQUE: 2 views; Frontal and lateral views of the chest were obtained.    COMPARISON: CT chest 10/18/2021.    FINDINGS:    Heart size is normal.  Clear lungs.  The visualized osseous structures demonstrate no acute pathology.      IMPRESSION:  Clear lungs.    --- End of Report ---              DARIAN WALLS MD; Resident Radiology  This document has been electronically signed.  NAYELI CAO MD; Attending Radiologist  This document has been electronically signed. Oct 19 2021 12:36PM    < end of copied text >      Troponin T, High Sensitivity (10.18.21 @ 15:57)    Troponin T, High Sensitivity Result: <6: Specimen not hemolyzed  *  *  Rapid upward or downward changes in high-sensitivity troponin levels  suggest acute myocardial injury. Renal impairment may cause sustained  troponin elevations.  Normal: <6 - 14 ng/L  Indeterminate: 15-51 ng/L  Elevated: > 51 ng/L  See [email or web address]/test/[email or web address] on the [facility] intranet for more  information ng/L

## 2021-10-19 NOTE — BEHAVIORAL HEALTH ASSESSMENT NOTE - CASE SUMMARY
The patient is a 31 yo  man with PMH of COVID-19 infection in Jan 2021 who presents with subacute worsening of his is intermittent chest pain over last few weeks. He states ever since his COVID-19 infection, he has not felt the same and has had intermittent sharp/burning intermittent chest pains along with GI upset.  Patient was sent into the ED by his PCP for further evaluation of hist chest pain. The patient has a history of a similar episode in April where he was admitted to the hospital for chest pain. After a thorough work up, the patient was discharged with the diagnosis of a panic attack. The patient states that he has a psychiatrist who he follows with. He is not on any medication for anxiety, however, he was on Cymbalta for a few days in the past, which was discontinued due to episodes of dizziness. The patient states that he has had difficulty sleeping, lost interest in daily activities, and has low energy and a decreased appetite since getting sick with COVID-19 in January. He repeatedly states that he doesn't want to die. He lives with his wife and 1 year old daughter at home, but states that he lays in bed and doesn't come in contact with them because he fears catching COVID19 from them again. The patient worked as an  in the past, but has not been able to work since getting COVID19 since he cannot concentrate. He states he used to interact with friends and visit family in the past, but since getting COVID19 in January he has been too anxious to leave the house. The patient denies any substance abuse. He has a family history of some psychiatric issues. His mother has a history of depression and he states his aunt hears voices and "believes she is allergic to electricity".  Patient denies ever having any symptoms of psychosis or jim.  He says he is hopeful about the future. He may benefit from small doses of Ativan for anxiety while admitted.

## 2021-10-19 NOTE — CONSULT NOTE ADULT - ASSESSMENT
30 year old man with hx of COVID presents with atypical chest pain    1. Atypical chest pain, no other associated GI symptoms. Likely costochondritis. Supportive care    2. Anxiety: patient's anxiety is profoundly interfering with his life  -appreciate psych input    3. Parainfluenza     4. Functional dyspepsia. Had EGD  -on PPI/amitriptyline  -follow up amylase/lipase  -check US abdomen      I had a prolonged conversation with the patient regarding the hospital course, differential diagnosis, results of diagnostic tests this far, and therapeutic modalities available. Plan of care discussed with the patient after the evaluation. Patient expresses a clear understanding of the plan of care.  125 minutes spent on the total encounter, of which more than fifty percent of the encounter was spent on counseling and/or coordinating care by the attending physician.  Advanced care planning forms were discussed. Code status including forceful chest compressions, defibrillation and intubation were discussed. The risks benefits and alternatives to pertinent gastrointestinal procedures and interventions were discussed in detail and all questions were answered. Duration: 15 Minutes.      Mal Stiles M.D.   Gastroenterology and Hepatology  [address] [place], [place]  Office: [phone]  Cell: [phone] 30 year old man with hx of COVID presents with atypical chest pain    1. Atypical chest pain, no other associated GI symptoms. Likely costochondritis. Supportive care    2. Anxiety: patient's anxiety is profoundly interfering with his life  -appreciate psych input    3. Parainfluenza     4. Functional dyspepsia. Had EGD  -on PPI/amitriptyline  -follow up amylase/lipase  -check CT abdomen to assess for signs of inflammatory bowel disease      I had a prolonged conversation with the patient regarding the hospital course, differential diagnosis, results of diagnostic tests this far, and therapeutic modalities available. Plan of care discussed with the patient after the evaluation. Patient expresses a clear understanding of the plan of care.  125 minutes spent on the total encounter, of which more than fifty percent of the encounter was spent on counseling and/or coordinating care by the attending physician.  Advanced care planning forms were discussed. Code status including forceful chest compressions, defibrillation and intubation were discussed. The risks benefits and alternatives to pertinent gastrointestinal procedures and interventions were discussed in detail and all questions were answered. Duration: 15 Minutes.      Mal Stiles M.D.   Gastroenterology and Hepatology  [address] [place], [place]  Office: [phone]  Cell: [phone]

## 2021-10-19 NOTE — CONSULT NOTE ADULT - ASSESSMENT
29 yo male with PMH of COVID-19 infection in Jan 2021 who presents with subacute worsening of his is intermittent chest pain over last few weeks. He states ever since his COVID-19 infection, he has not felt the same and has had intermittent sharp/burning intermittent chest pains along with GI upset. He describes the chest pain as sometimes sharp and sometimes burning both in his anterior left chest wall and anterior right chest wall, reproducible with palpation. Exacerbated with certain movements, positional changes, and sometimes after eating. His symptoms associated with subjective fever, migraine headaches, occasional lightheadedness, sore throat, nausea, dry heaving, and belching. Denies any recent sick contacts, cough, dyspnea, diarrhea, constipation, dysuria nor frequency. Of note, he recently had b/l earache for which he was taking doxycycline for since 10/12/21 (last dose this morning) with symptoms resolved. Patient was sent into the ED by his PCP for further evaluation.    In the ED, vitals unremarkable. Labs notable for leukocytosis to 18K, trop neg x 2, COVID-19 PCR negative. CXR with clear lungs. Admitted under Dr. Pang's service for further work-up.  (18 Oct 2021 18:49)  ADDITIONAL HISTORY pt notes that he doesn't know where he was exposed to the parainfluenza  He claims that he doesn't leave house and  stays in bed most of the day. He notes that his 1/1/1 yo child does not go to . His wife is OT in a school. Pt claims he goes to doctor appointments almost every day. Pt had trouble pinpointing when things became worse . He came to ER though for chest pain, reproducible with touching chest. He denies diarrhea or urinary sxs. He notes chronic throat pain. He notes bifrontal headache. He claims his weight has gone from 296--> 235 pounds.  Pt kept asking if he was going to die and then would cry saying that he has a little baby at home.  Pt claims that his taste is normal but he has no appetite and that his stomach hurts him.  Pt was upset that examiner was leaving. WIll you come back? I want to be home for my birthday and cried again. Ears were bothering patient but not any more. Pt notes he showers twice a day and uses Q tips. Pt notes some sinus dripping      A/P    #PARAINFLUENZA VIRUS  HARD to tell how long patient has been sick- not clear from his history  He is in doctors offices frequently so perhaps picked up from there. WIfe works in schools but isn't sick  Hard to know if related to chest pain  lungs are clear  throat pain could be related   supportive care    #CHEST PAIN  ? costochondritis   troponins are not elevated    # ABDOMINAL PAIN / WEIGHT LOSS  SUggest GI evaluation  COnsider investigation for H Pylori  normal TSH, TFTs    #ELEVATED wbc  shouldn't be from the parainfluenza virus  Pt notes this is not new and that he has seen a hematologist for this  Can check blood cultures but no evidence of sepsis . No left shift No fevers   check cortisol level  heme follow up    #SORE THROAT, EAR PAIN  outer canal mild erythema ( uses Q tips) , normal TM  visible tonsils  check throat culture   check ASLO  check EBV serology  ?ENT input if persists    #ANXIETY / DEPRESSSION  Pt has crippling anxiety. Pt would greatly benefit from psych in put  Pt unable to work or function b/o this.      Dolores Gould M.D. ,   Pager [phone]     after 5PM/ weekends [phone]    Assessment and plan discussed with the primary team .       29 yo male with PMH of COVID-19 infection in Jan 2021 who presents with subacute worsening of his is intermittent chest pain over last few weeks. He states ever since his COVID-19 infection, he has not felt the same and has had intermittent sharp/burning intermittent chest pains along with GI upset. He describes the chest pain as sometimes sharp and sometimes burning both in his anterior left chest wall and anterior right chest wall, reproducible with palpation. Exacerbated with certain movements, positional changes, and sometimes after eating. His symptoms associated with subjective fever, migraine headaches, occasional lightheadedness, sore throat, nausea, dry heaving, and belching. Denies any recent sick contacts, cough, dyspnea, diarrhea, constipation, dysuria nor frequency. Of note, he recently had b/l earache for which he was taking doxycycline for since 10/12/21 (last dose this morning) with symptoms resolved. Patient was sent into the ED by his PCP for further evaluation.    In the ED, vitals unremarkable. Labs notable for leukocytosis to 18K, trop neg x 2, COVID-19 PCR negative. CXR with clear lungs. Admitted under Dr. Pang's service for further work-up.  (18 Oct 2021 18:49)  ADDITIONAL HISTORY pt notes that he doesn't know where he was exposed to the parainfluenza  He claims that he doesn't leave house and  stays in bed most of the day. He notes that his 1/1/3 yo child does not go to . His wife is OT in a school. Pt claims he goes to doctor appointments almost every day. Pt had trouble pinpointing when things became worse . He came to ER though for chest pain, reproducible with touching chest. He denies diarrhea or urinary sxs. He notes chronic throat pain. He notes bifrontal headache. He claims his weight has gone from 296--> 235 pounds.  Pt kept asking if he was going to die and then would cry saying that he has a little baby at home.  Pt claims that his taste is normal but he has no appetite and that his stomach hurts him.  Pt was upset that examiner was leaving. WIll you come back? I want to be home for my birthday and cried again. Ears were bothering patient but not any more. Pt notes he showers twice a day and uses Q tips. Pt notes some sinus dripping      A/P    #PARAINFLUENZA VIRUS  HARD to tell how long patient has been sick- not clear from his history  He is in doctors offices frequently so perhaps picked up from there. WIfe works in schools but isn't sick  Hard to know if related to chest pain  lungs are clear  throat pain could be related   supportive care    #CHEST PAIN  ? costochondritis   troponins are not elevated    # ABDOMINAL PAIN / WEIGHT LOSS  SUggest GI evaluation  COnsider investigation for H Pylori  normal TSH, TFTs  check amylase , lipase   ? related to anxiety    #ELEVATED wbc  shouldn't be from the parainfluenza virus  Pt notes this is not new and that he has seen a hematologist for this  Can check blood cultures but no evidence of sepsis . No left shift No fevers   check cortisol level  heme follow up    #SORE THROAT, EAR PAIN  outer canal mild erythema ( uses Q tips) , normal TM  visible tonsils  check throat culture   check ASLO  check EBV serology  ?ENT input if persists    #ANXIETY / DEPRESSSION  Pt has crippling anxiety. Pt would greatly benefit from psych in put  Pt unable to work or function b/o this.      Dolores oGuld M.D. ,   Pager [phone]     after 5PM/ weekends [phone]    Assessment and plan discussed with the primary team .

## 2021-10-19 NOTE — BEHAVIORAL HEALTH ASSESSMENT NOTE - HPI (INCLUDE ILLNESS QUALITY, SEVERITY, DURATION, TIMING, CONTEXT, MODIFYING FACTORS, ASSOCIATED SIGNS AND SYMPTOMS)
31 yo male with PMH of COVID-19 infection in Jan 2021 who presents with subacute worsening of his is intermittent chest pain over last few weeks. He states ever since his COVID-19 infection, he has not felt the same and has had intermittent sharp/burning intermittent chest pains along with GI upset. He describes the chest pain as sometimes sharp and sometimes burning both in his anterior left chest wall and anterior right chest wall, reproducible with palpation. Exacerbated with certain movements, positional changes, and sometimes after eating. His symptoms associated with subjective fever, migraine headaches, occasional lightheadedness, sore throat, nausea, dry heaving, and belching. Denies any recent sick contacts, cough, dyspnea, diarrhea, constipation, dysuria nor frequency. Of note, he recently had b/l earache for which he was taking doxycycline for since 10/12/21 (last dose this morning) with symptoms resolved. Patient was sent into the ED by his PCP for further evaluation. The patient is a 31 yo male with PMH of COVID-19 infection in Jan 2021 who presents with subacute worsening of his is intermittent chest pain over last few weeks. He states ever since his COVID-19 infection, he has not felt the same and has had intermittent sharp/burning intermittent chest pains along with GI upset. He describes the chest pain as sometimes sharp and sometimes burning both in his anterior left chest wall and anterior right chest wall, reproducible with palpation. Exacerbated with certain movements, positional changes, and sometimes after eating. His symptoms associated with subjective fever, migraine headaches, occasional lightheadedness, sore throat, nausea, dry heaving, and belching. Denies any recent sick contacts, cough, dyspnea, diarrhea, constipation, dysuria nor frequency. Of note, he recently had b/l earache for which he was taking doxycycline for since 10/12/21 (last dose this morning) with symptoms resolved. Patient was sent into the ED by his PCP for further evaluation of hist chest pain. The patient has a history of a similar episode in April where he was admitted to the hospital for chest pain. After a thorough work up, the patient was discharged with the diagnosis of a panic attack. The patient states that he has a psychiatrist who he follows with. He is not on any medication for anxiety, however, he was on Cymbalta for a few days in the past, which was discontinued due to episodes of dizziness. The patient states that he has had difficulty sleeping, lost interest in daily activities, and has low energy and a decreased appetite since getting sick with COVID-19 in January. He repeatedly states that he doesn't want to die. He lives with his wife and 1 year old daughter at home, but states that he lays in bed and doesn't come in contact with them because he fears catching COVID19 from them again. The patient worked as an  in the past, but has not been able to work since getting COVID19 since he cannot concentrate. He states he used to interact with friends and visit family in the past, but since getting COVID19 in January he has been too anxious to leave the house. The patient denies any substance abuse. His mother has a history of depression and he states his aunt hears voices and "believes she is allergic to electricity". The patient is a 29 yo  man with PMH of COVID-19 infection in Jan 2021 who presents with subacute worsening of his is intermittent chest pain over last few weeks. He states ever since his COVID-19 infection, he has not felt the same and has had intermittent sharp/burning intermittent chest pains along with GI upset. He describes the chest pain as sometimes sharp and sometimes burning both in his anterior left chest wall and anterior right chest wall, reproducible with palpation. Exacerbated with certain movements, positional changes, and sometimes after eating. His symptoms associated with subjective fever, migraine headaches, occasional lightheadedness, sore throat, nausea, dry heaving, and belching. Denies any recent sick contacts, cough, dyspnea, diarrhea, constipation, dysuria nor frequency. Of note, he recently had b/l earache for which he was taking doxycycline for since 10/12/21 (last dose this morning) with symptoms resolved. Patient was sent into the ED by his PCP for further evaluation of hist chest pain. The patient has a history of a similar episode in April where he was admitted to the hospital for chest pain. After a thorough work up, the patient was discharged with the diagnosis of a panic attack. The patient states that he has a psychiatrist who he follows with. He is not on any medication for anxiety, however, he was on Cymbalta for a few days in the past, which was discontinued due to episodes of dizziness. The patient states that he has had difficulty sleeping, lost interest in daily activities, and has low energy and a decreased appetite since getting sick with COVID-19 in January. He repeatedly states that he doesn't want to die. He lives with his wife and 1 year old daughter at home, but states that he lays in bed and doesn't come in contact with them because he fears catching COVID19 from them again. The patient worked as an  in the past, but has not been able to work since getting COVID19 since he cannot concentrate. He states he used to interact with friends and visit family in the past, but since getting COVID19 in January he has been too anxious to leave the house. The patient denies any substance abuse. He has a family history of some psychiatric issues. His mother has a history of depression and he states his aunt hears voices and "believes she is allergic to electricity".  Patient denies ever having any symptoms of psychosis or jim.  He says he is hopeful about the future. He has no thoughts of harming himself or others.

## 2021-10-19 NOTE — CHART NOTE - NSCHARTNOTEFT_GEN_A_CORE
31 yo male with PMH of COVID-19 infection in Jan 2021 who presents with subacute worsening of his is intermittent chest pain over last few weeks.  as per DR Pang no need for cardiac enzyme  he is schedule for ct coronary-  EKG no Twave inversion sinus alysia 53  D/w DR Poly cabrera PA_C 29 yo male with PMH of COVID-19 infection in Jan 2021 who presents with subacute worsening of his is intermittent chest pain over last few weeks.  as per DR Pang no need for cardiac enzyme as multiple sets have beennormal  he is schedule for ct coronary-  EKG no ischemic changes, sinus alysia 53  D/w DR Poly cabrera PA_C

## 2021-10-19 NOTE — BEHAVIORAL HEALTH ASSESSMENT NOTE - NSBHCHARTREVIEWLAB_PSY_A_CORE FT
15.6   12.21 )-----------( 319      ( 19 Oct 2021 07:18 )             47.9     10-19    142  |  105  |  13  ----------------------------<  82  4.3   |  22  |  1.06    Ca    9.7      19 Oct 2021 07:18  Phos  4.8     10-19  Mg     2.4     10-19    TPro  7.4  /  Alb  4.6  /  TBili  0.6  /  DBili  x   /  AST  21  /  ALT  34  /  AlkPhos  83  10-19

## 2021-10-19 NOTE — BEHAVIORAL HEALTH ASSESSMENT NOTE - ADDITIONAL DETAILS / COMMENTS
The patient is very anxious about his health and is concerned that he may die from covid. He denies any SI.

## 2021-10-19 NOTE — CONSULT NOTE ADULT - SUBJECTIVE AND OBJECTIVE BOX
Chief Complaint:  Patient is a 30y old  Male who presents with a chief complaint of chest pain (19 Oct 2021 14:12)      Date of service: 10-19-21 @ 21:20    HPI:    The patient is a 30 year old man with MH of COVID-19 infection in 2021 who presents with subacute worsening of his is intermittent chest pain over last few weeks. He states ever since his COVID-19 infection, he has not felt the same and has had intermittent sharp/burning intermittent chest pains along with GI upset. He has seen Dr. Perea who perfromed and EGD and sigmoidoscopy which were unremarkable and started him on a PPI and amitryptyline. He states he has been unable function in his home and professional life.    The patient denies dysphagia, nausea and vomiting, abdominal pain, diarrhea, unintentional weight loss, change in bowel habits or NSAID use.      Allergies:  No Known Allergies      Home Medications:    Hospital Medications:  acetaminophen   Tablet .. 650 milliGRAM(s) Oral every 6 hours PRN  ALPRAZolam 0.25 milliGRAM(s) Oral once  aluminum hydroxide/magnesium hydroxide/simethicone Suspension 30 milliLiter(s) Oral every 4 hours PRN  enoxaparin Injectable 40 milliGRAM(s) SubCutaneous daily  influenza   Vaccine 0.5 milliLiter(s) IntraMuscular once  lactobacillus acidophilus 1 Tablet(s) Oral daily  melatonin 3 milliGRAM(s) Oral at bedtime PRN  ondansetron Injectable 4 milliGRAM(s) IV Push every 8 hours PRN  pantoprazole    Tablet 40 milliGRAM(s) Oral before breakfast      PMHX/PSHX:  No pertinent past medical history    COVID-19    No significant past surgical history        Family history:      Social History:   Denies ethanol use.  Denies illicit drug use.    ROS:     General:  No wt loss, fevers, chills, night sweats, fatigue,   Eyes:  Good vision, no reported pain  ENT:  No sore throat, pain, runny nose, dysphagia  CV:  No pain, palpitations, hypo/hypertension  Resp:  No dyspnea, cough, tachypnea, wheezing  GI:  See HPI  :  No pain, bleeding, incontinence, nocturia  Muscle:  No pain, weakness  Neuro:  No weakness, tingling, memory problems  Psych:  No fatigue, insomnia, mood problems, depression  Endocrine:  No polyuria, polydipsia, cold/heat intolerance  Heme:  No petechiae, ecchymosis, easy bruisability  Integumentary:  No rash, edema      PHYSICAL EXAM:     GENERAL:  Appears stated age, well-groomed, well-nourished, no distress  HEENT:  NC/AT,  conjunctivae anicteric, clear and pink,   NECK: supple, trachea midline  CHEST:  Full & symmetric excursion, no increased effort, breath sounds clear, tenderness to palpation  HEART:  Regular rhythm, no JVD  ABDOMEN:  Soft, non-tender, non-distended, normoactive bowel sounds,  no masses , no hepatosplenomegaly  EXTREMITIES:  no cyanosis,clubbing or edema  SKIN:  No rash, erythema, or, ecchymoses, no jaundice  NEURO:  Alert, non-focal, no asterixis  PSYCH: Appropriate affect, oriented to place and time  RECTAL: Deferred      Vital Signs:  Vital Signs Last 24 Hrs  T(C): 37.1 (19 Oct 2021 19:26), Max: 37.1 (19 Oct 2021 19:26)  T(F): 98.8 (19 Oct 2021 19:26), Max: 98.8 (19 Oct 2021 19:26)  HR: 77 (19 Oct 2021 19:26) (58 - 91)  BP: 137/92 (19 Oct 2021 19:26) (126/79 - 147/96)  BP(mean): --  RR: 18 (19 Oct 2021 19:26) (18 - 25)  SpO2: 97% (19 Oct 2021 19:26) (97% - 100%)  Daily     Daily Weight in k (19 Oct 2021 17:52)    LABS: Labs personally reviewed by me:                        15.6   12.21 )-----------( 319      ( 19 Oct 2021 07:18 )             47.9     10-19    142  |  105  |  13  ----------------------------<  82  4.3   |  22  |  1.06    Ca    9.7      19 Oct 2021 07:18  Phos  4.8     10-19  Mg     2.4     10-19    TPro  7.4  /  Alb  4.6  /  TBili  0.6  /  DBili  x   /  AST  21  /  ALT  34  /  AlkPhos  83  10    LIVER FUNCTIONS - ( 19 Oct 2021 07:18 )  Alb: 4.6 g/dL / Pro: 7.4 g/dL / ALK PHOS: 83 U/L / ALT: 34 U/L / AST: 21 U/L / GGT: x                   Imaging personally reviewed by me:

## 2021-10-19 NOTE — BEHAVIORAL HEALTH ASSESSMENT NOTE - SUMMARY
The patient is a 29 yo  man with PMH of COVID-19 infection in Jan 2021 who presents with subacute worsening of his is intermittent chest pain over last few weeks. He states ever since his COVID-19 infection, he has not felt the same and has had intermittent sharp/burning intermittent chest pains along with GI upset. He describes the chest pain as sometimes sharp and sometimes burning both in his anterior left chest wall and anterior right chest wall, reproducible with palpation. Exacerbated with certain movements, positional changes, and sometimes after eating. His symptoms associated with subjective fever, migraine headaches, occasional lightheadedness, sore throat, nausea, dry heaving, and belching. Denies any recent sick contacts, cough, dyspnea, diarrhea, constipation, dysuria nor frequency. Of note, he recently had b/l earache for which he was taking doxycycline for since 10/12/21 (last dose this morning) with symptoms resolved. Patient was sent into the ED by his PCP for further evaluation of hist chest pain. The patient has a history of a similar episode in April where he was admitted to the hospital for chest pain. After a thorough work up, the patient was discharged with the diagnosis of a panic attack. The patient states that he has a psychiatrist who he follows with. He is not on any medication for anxiety, however, he was on Cymbalta for a few days in the past, which was discontinued due to episodes of dizziness. The patient states that he has had difficulty sleeping, lost interest in daily activities, and has low energy and a decreased appetite since getting sick with COVID-19 in January. He repeatedly states that he doesn't want to die. He lives with his wife and 1 year old daughter at home, but states that he lays in bed and doesn't come in contact with them because he fears catching COVID19 from them again. The patient worked as an  in the past, but has not been able to work since getting COVID19 since he cannot concentrate. He states he used to interact with friends and visit family in the past, but since getting COVID19 in January he has been too anxious to leave the house. The patient denies any substance abuse. He has a family history of some psychiatric issues. His mother has a history of depression and he states his aunt hears voices and "believes she is allergic to electricity".  Patient denies ever having any symptoms of psychosis or jim.  He says he is hopeful about the future.

## 2021-10-20 ENCOUNTER — TRANSCRIPTION ENCOUNTER (OUTPATIENT)
Age: 31
End: 2021-10-20

## 2021-10-20 LAB
AMYLASE P1 CFR SERPL: 30 U/L — SIGNIFICANT CHANGE UP (ref 25–125)
LIDOCAIN IGE QN: 19 U/L — SIGNIFICANT CHANGE UP (ref 7–60)

## 2021-10-20 PROCEDURE — 99232 SBSQ HOSP IP/OBS MODERATE 35: CPT

## 2021-10-20 RX ORDER — OMEPRAZOLE 10 MG/1
1 CAPSULE, DELAYED RELEASE ORAL
Qty: 0 | Refills: 0 | DISCHARGE

## 2021-10-20 RX ORDER — PANTOPRAZOLE SODIUM 20 MG/1
1 TABLET, DELAYED RELEASE ORAL
Qty: 30 | Refills: 0
Start: 2021-10-20 | End: 2021-11-18

## 2021-10-20 RX ORDER — LANOLIN ALCOHOL/MO/W.PET/CERES
1 CREAM (GRAM) TOPICAL
Qty: 0 | Refills: 0 | DISCHARGE
Start: 2021-10-20

## 2021-10-20 RX ORDER — ACETAMINOPHEN 500 MG
2 TABLET ORAL
Qty: 0 | Refills: 0 | DISCHARGE
Start: 2021-10-20

## 2021-10-20 RX ADMIN — Medication 20 MILLIGRAM(S): at 17:26

## 2021-10-20 RX ADMIN — ENOXAPARIN SODIUM 40 MILLIGRAM(S): 100 INJECTION SUBCUTANEOUS at 11:09

## 2021-10-20 RX ADMIN — Medication 1 TABLET(S): at 11:09

## 2021-10-20 RX ADMIN — PANTOPRAZOLE SODIUM 40 MILLIGRAM(S): 20 TABLET, DELAYED RELEASE ORAL at 06:06

## 2021-10-20 NOTE — BH CONSULTATION LIAISON PROGRESS NOTE - NSBHCHARTREVIEWVS_PSY_A_CORE FT
Vital Signs Last 24 Hrs  T(C): 36.9 (20 Oct 2021 10:54), Max: 37.1 (19 Oct 2021 19:26)  T(F): 98.4 (20 Oct 2021 10:54), Max: 98.8 (19 Oct 2021 19:26)  HR: 84 (20 Oct 2021 10:54) (69 - 84)  BP: 151/82 (20 Oct 2021 10:54) (117/72 - 151/82)  BP(mean): --  RR: 18 (20 Oct 2021 10:54) (18 - 18)  SpO2: 99% (20 Oct 2021 10:54) (97% - 99%)

## 2021-10-20 NOTE — BH CONSULTATION LIAISON PROGRESS NOTE - NSBHCHARTREVIEWINVESTIGATE_PSY_A_CORE FT
< from: 12 Lead ECG (10.15.21 @ 10:27) >      Ventricular Rate 86 BPM    Atrial Rate 86 BPM    P-R Interval 146 ms    QRS Duration 90 ms    Q-T Interval 348 ms    QTC Calculation(Bazett) 416 ms    P Axis 11 degrees    R Axis 34 degrees    T Axis 45 degrees    Diagnosis Line SINUS RHYTHM WITH MARKED SINUS ARRHYTHMIA  OTHERWISE NORMAL ECG  NO PREVIOUS ECGS AVAILABLE  Confirmed by MD Nabor, Talha ([id number]) on 10/16/2021 4:30:51 PM    < end of copied text >

## 2021-10-20 NOTE — CONSULT NOTE ADULT - SUBJECTIVE AND OBJECTIVE BOX
DESI MCNAMARA  MRN-[id number]    Patient is a 30y old  Male who presents with a chief complaint of chest pain (19 Oct 2021 21:20)      HPI  HPI:  31 yo male with PMH of COVID-19 infection in Jan 2021 who presents with subacute worsening of his is intermittent chest pain over last few weeks. He states ever since his COVID-19 infection, he has not felt the same and has had intermittent sharp/burning intermittent chest pains along with GI upset. He describes the chest pain as sometimes sharp and sometimes burning both in his anterior left chest wall and anterior right chest wall, reproducible with palpation. Exacerbated with certain movements, positional changes, and sometimes after eating. His symptoms associated with subjective fever, migraine headaches, occasional lightheadedness, sore throat, nausea, dry heaving, and belching. Denies any recent sick contacts, cough, dyspnea, diarrhea, constipation, dysuria nor frequency. Of note, he recently had b/l earache for which he was taking doxycycline for since 10/12/21 (last dose this morning) with symptoms resolved. Patient was sent into the ED by his PCP for further evaluation.    In the ED, vitals unremarkable. Labs notable for leukocytosis to 18K, trop neg x 2, COVID-19 PCR negative. CXR with clear lungs. Admitted under Dr. Pang's service for further work-up.  (18 Oct 2021 18:49)      Past Medical History  PAST MEDICAL & SURGICAL HISTORY:  COVID-19  Jan 2021    No significant past surgical history        Current Meds  MEDICATIONS  (STANDING):  ALPRAZolam 0.25 milliGRAM(s) Oral once  enoxaparin Injectable 40 milliGRAM(s) SubCutaneous daily  influenza   Vaccine 0.5 milliLiter(s) IntraMuscular once  lactobacillus acidophilus 1 Tablet(s) Oral daily  pantoprazole    Tablet 40 milliGRAM(s) Oral before breakfast    MEDICATIONS  (PRN):  acetaminophen   Tablet .. 650 milliGRAM(s) Oral every 6 hours PRN Temp greater or equal to 38C (100.4F), Mild Pain (1 - 3)  aluminum hydroxide/magnesium hydroxide/simethicone Suspension 30 milliLiter(s) Oral every 4 hours PRN Dyspepsia  melatonin 3 milliGRAM(s) Oral at bedtime PRN Insomnia  ondansetron Injectable 4 milliGRAM(s) IV Push every 8 hours PRN Nausea and/or Vomiting      Allergies  Allergies    No Known Allergies    Intolerances        Social History  , Retired. No tob.  No etoh    Family History  FAMILY HISTORY:      Review of System  REVIEW OF SYSTEMS      General:	Denies fatigue, fevers, chills, sweats, decreased appetite.    Skin/Breast: denies pruritis, rash  	  Ophthalmologic: no change in vision or blurring  	  HEENT	Denies dry mouth, oral sores, dysphagia,  change in hearing.    Respiratory and Thorax:  cough, sob, wheeze, hemoptysis  	  Cardiovascular:	no cp , palp, orthopnea    Gastrointestinal:	no n/v/d constipation    Genitourinary:	no dysuria of frequency, no hematuria, no flank pain    Musculoskeletal:	no bone or joint pain. no muscle aches.     Neurological:	no change in sensory or motor function. no headache. no weakness.     Psychiatric:	no depression, no anxiety, insomnia.     Hematology/Lymphatics:	no bleeding or bruising        Vitals  Vital Signs Last 24 Hrs  T(C): 36.8 (19 Oct 2021 22:17), Max: 37.1 (19 Oct 2021 19:26)  T(F): 98.3 (19 Oct 2021 22:17), Max: 98.8 (19 Oct 2021 19:26)  HR: 69 (19 Oct 2021 22:17) (58 - 91)  BP: 117/72 (19 Oct 2021 22:17) (117/72 - 147/96)  BP(mean): --  RR: 18 (19 Oct 2021 22:17) (18 - 25)  SpO2: 98% (19 Oct 2021 22:17) (97% - 100%)    Physical Exam  PHYSICAL EXAM:      Constitutional: NAD    Eyes: PERRLA EOMI, anicteric sclera    Heent :No oral sores, no pharyngeal injection. moist mucosa.    Neck: supple, no jvd, no LAD    Respiratory: CTA b/l     Cardiovascular: s1s2, no m/g/r    Gastrointestinal: soft, nt, nd, + BS    Extremities: no c/c/e    Neurological:A&O x 3 moves all ext.    Skin: no rash on exposed skin    Lymph Nodes: no lymphadenopathy.              Lab  CBC Full  -  ( 19 Oct 2021 07:18 )  WBC Count : 12.21 K/uL  RBC Count : 5.32 M/uL  Hemoglobin : 15.6 g/dL  Hematocrit : 47.9 %  Platelet Count - Automated : 319 K/uL  Mean Cell Volume : 90.0 fl  Mean Cell Hemoglobin : 29.3 pg  Mean Cell Hemoglobin Concentration : 32.6 gm/dL  Auto Neutrophil # : 7.98 K/uL  Auto Lymphocyte # : 2.90 K/uL  Auto Monocyte # : 1.09 K/uL  Auto Eosinophil # : 0.09 K/uL  Auto Basophil # : 0.05 K/uL  Auto Neutrophil % : 65.4 %  Auto Lymphocyte % : 23.8 %  Auto Monocyte % : 8.9 %  Auto Eosinophil % : 0.7 %  Auto Basophil % : 0.4 %    10-19    142  |  105  |  13  ----------------------------<  82  4.3   |  22  |  1.06    Ca    9.7      19 Oct 2021 07:18  Phos  4.8     10-19  Mg     2.4     10-19    TPro  7.4  /  Alb  4.6  /  TBili  0.6  /  DBili  x   /  AST  21  /  ALT  34  /  AlkPhos  83  10-19        Rad:    Assessment/Plan         DESI MCNAMARA  MRN-[id number]    Patient is a 30y old  Male who presents with a chief complaint of chest pain (19 Oct 2021 21:20)    HPI:  31 yo male with PMH of COVID-19 infection in Jan 2021 who presents with subacute worsening of his is intermittent chest pain over last few weeks. He states ever since his COVID-19 infection, he has not felt the same and has had intermittent sharp/burning intermittent chest pains along with GI upset. He describes the chest pain as sometimes sharp and sometimes burning both in his anterior left chest wall and anterior right chest wall, reproducible with palpation. Exacerbated with certain movements, positional changes, and sometimes after eating. His symptoms associated with subjective fever, migraine headaches, occasional lightheadedness, sore throat, nausea, dry heaving, and belching. Denies any recent sick contacts, cough, dyspnea, diarrhea, constipation, dysuria nor frequency. Of note, he recently had b/l earache for which he was taking doxycycline for since 10/12/21 (last dose this morning) with symptoms resolved. Patient was sent into the ED by his PCP for further evaluation.    Patient with recent outpt hematology evaluation. recent ct of a/p as outpt.   Now RVP, parainfluenza +    Past Medical History  PAST MEDICAL & SURGICAL HISTORY:  COVID-19  Jan 2021    No significant past surgical history    Current Meds  MEDICATIONS  (STANDING):  ALPRAZolam 0.25 milliGRAM(s) Oral once  enoxaparin Injectable 40 milliGRAM(s) SubCutaneous daily  influenza   Vaccine 0.5 milliLiter(s) IntraMuscular once  lactobacillus acidophilus 1 Tablet(s) Oral daily  pantoprazole    Tablet 40 milliGRAM(s) Oral before breakfast    MEDICATIONS  (PRN):  acetaminophen   Tablet .. 650 milliGRAM(s) Oral every 6 hours PRN Temp greater or equal to 38C (100.4F), Mild Pain (1 - 3)  aluminum hydroxide/magnesium hydroxide/simethicone Suspension 30 milliLiter(s) Oral every 4 hours PRN Dyspepsia  melatonin 3 milliGRAM(s) Oral at bedtime PRN Insomnia  ondansetron Injectable 4 milliGRAM(s) IV Push every 8 hours PRN Nausea and/or Vomiting      Allergies    No Known Allergies    Social History  No tob.  No etoh      FAMILY HISTORY: non-contrib      REVIEW OF SYSTEMS      General:	as above    Skin/Breast: denies pruritis, rash  	  Ophthalmologic: no change in vision or blurring  	  HEENT	Denies dry mouth, oral sores, dysphagia,  change in hearing.    Respiratory and Thorax:  cough, sob, wheeze, hemoptysis  	  Cardiovascular:	no cp , palp, orthopnea    Gastrointestinal:	no n/v/d constipation    Genitourinary:	no dysuria of frequency, no hematuria, no flank pain    Musculoskeletal:	no bone or joint pain. no muscle aches.     Neurological:	no change in sensory or motor function. no headache. no weakness.     Psychiatric:	no depression, no anxiety, insomnia.     Hematology/Lymphatics:	no bleeding or bruising      Vital Signs Last 24 Hrs  T(C): 36.8 (19 Oct 2021 22:17), Max: 37.1 (19 Oct 2021 19:26)  T(F): 98.3 (19 Oct 2021 22:17), Max: 98.8 (19 Oct 2021 19:26)  HR: 69 (19 Oct 2021 22:17) (58 - 91)  BP: 117/72 (19 Oct 2021 22:17) (117/72 - 147/96)  BP(mean): --  RR: 18 (19 Oct 2021 22:17) (18 - 25)  SpO2: 98% (19 Oct 2021 22:17) (97% - 100%)      PHYSICAL EXAM:    Constitutional: NAD    Eyes: PERRLA EOMI, anicteric sclera    Heent :No oral sores, no pharyngeal injection. moist mucosa.    Neck: supple, no jvd, no LAD    Respiratory: CTA b/l     Cardiovascular: s1s2, no m/g/r    Gastrointestinal: soft, nt, nd, + BS    Extremities: no c/c/e    Neurological:A&O x 3 moves all ext.    Skin: no rash on exposed skin    Lymph Nodes: no lymphadenopathy.              Lab  CBC Full  -  ( 19 Oct 2021 07:18 )  WBC Count : 12.21 K/uL  RBC Count : 5.32 M/uL  Hemoglobin : 15.6 g/dL  Hematocrit : 47.9 %  Platelet Count - Automated : 319 K/uL  Mean Cell Volume : 90.0 fl  Mean Cell Hemoglobin : 29.3 pg  Mean Cell Hemoglobin Concentration : 32.6 gm/dL  Auto Neutrophil # : 7.98 K/uL  Auto Lymphocyte # : 2.90 K/uL  Auto Monocyte # : 1.09 K/uL  Auto Eosinophil # : 0.09 K/uL  Auto Basophil # : 0.05 K/uL  Auto Neutrophil % : 65.4 %  Auto Lymphocyte % : 23.8 %  Auto Monocyte % : 8.9 %  Auto Eosinophil % : 0.7 %  Auto Basophil % : 0.4 %    10-19    142  |  105  |  13  ----------------------------<  82  4.3   |  22  |  1.06    Ca    9.7      19 Oct 2021 07:18  Phos  4.8     10-19  Mg     2.4     10-19    TPro  7.4  /  Alb  4.6  /  TBili  0.6  /  DBili  x   /  AST  21  /  ALT  34  /  AlkPhos  83  10-19        Rad:    Assessment/Plan

## 2021-10-20 NOTE — PROGRESS NOTE ADULT - ASSESSMENT
1. Atypical chest pain, no other associated GI symptoms. Likely costochondritis. Supportive care    2. Anxiety  -appreciate psych input    3. Parainfluenza     4. Functional dyspepsia. Had EGD, flex sig and recent CT (done 2 months ago at [facility], per report normal)  -continue PPI    5. Leukocytosis  -appreciate Heme

## 2021-10-20 NOTE — PROGRESS NOTE ADULT - SUBJECTIVE AND OBJECTIVE BOX
Chief Complaint:  Patient is a 30y old  Male who presents with a chief complaint of chest pain (20 Oct 2021 13:16)      Date of service 10-20-21 @ 13:40      Interval Events:   c/o of ear pain  Hospital Medications:  acetaminophen   Tablet .. 650 milliGRAM(s) Oral every 6 hours PRN  aluminum hydroxide/magnesium hydroxide/simethicone Suspension 30 milliLiter(s) Oral every 4 hours PRN  enoxaparin Injectable 40 milliGRAM(s) SubCutaneous daily  influenza   Vaccine 0.5 milliLiter(s) IntraMuscular once  lactobacillus acidophilus 1 Tablet(s) Oral daily  melatonin 3 milliGRAM(s) Oral at bedtime PRN  ondansetron Injectable 4 milliGRAM(s) IV Push every 8 hours PRN  pantoprazole    Tablet 40 milliGRAM(s) Oral before breakfast  PARoxetine 20 milliGRAM(s) Oral daily        Review of Systems:  General:  No wt loss, fevers, chills, night sweats, fatigue,   Eyes:  Good vision, no reported pain  ENT:  No sore throat, pain, runny nose, dysphagia  CV:  No pain, palpitations, hypo/hypertension  Resp:  No dyspnea, cough, tachypnea, wheezing  GI:  See HPI  :  No pain, bleeding, incontinence, nocturia  Muscle:  No pain, weakness  Neuro:  No weakness, tingling, memory problems  Psych:  No fatigue, insomnia, mood problems, depression  Endocrine:  No polyuria, polydipsia, cold/heat intolerance  Heme:  No petechiae, ecchymosis, easy bruisability  Integumentary:  No rash, edema    PHYSICAL EXAM:   Vital Signs:  Vital Signs Last 24 Hrs  T(C): 36.9 (20 Oct 2021 10:54), Max: 37.1 (19 Oct 2021 19:26)  T(F): 98.4 (20 Oct 2021 10:54), Max: 98.8 (19 Oct 2021 19:26)  HR: 84 (20 Oct 2021 10:54) (69 - 84)  BP: 151/82 (20 Oct 2021 10:54) (117/72 - 151/82)  BP(mean): --  RR: 18 (20 Oct 2021 10:54) (18 - 18)  SpO2: 99% (20 Oct 2021 10:54) (97% - 99%)  Daily     Daily Weight in k (20 Oct 2021 09:06)      PHYSICAL EXAM:     GENERAL:  Appears stated age, well-groomed, well-nourished, no distress  HEENT:  NC/AT,  conjunctivae anicteric, clear and pink,   NECK: supple, trachea midline  CHEST:  Full & symmetric excursion, no increased effort, breath sounds clear  HEART:  Regular rhythm, no JVD  ABDOMEN:  Soft, non-tender, non-distended, normoactive bowel sounds,  no masses , no hepatosplenomegaly  EXTREMITIES:  no cyanosis,clubbing or edema  SKIN:  No rash, erythema, or, ecchymoses, no jaundice  NEURO:  Alert, non-focal, no asterixis  PSYCH: Appropriate affect, oriented to place and time  RECTAL: Deferred      LABS Personally reviewed by me:                        15.6   12 )-----------( 319      ( 19 Oct 2021 07:18 )             47.9       10-19    142  |  105  |  13  ----------------------------<  82  4.3   |  22  |  1.06    Ca    9.7      19 Oct 2021 07:18  Phos  4.8     10-19  Mg     2.4     10-19    TPro  7.4  /  Alb  4.6  /  TBili  0.6  /  DBili  x   /  AST  21  /  ALT  34  /  AlkPhos  83  10-19    LIVER FUNCTIONS - ( 19 Oct 2021 07:18 )  Alb: 4.6 g/dL / Pro: 7.4 g/dL / ALK PHOS: 83 U/L / ALT: 34 U/L / AST: 21 U/L / GGT: x               Amylase Serum30      Lipase serum19       Ammonia--                          15.6   12.21 )-----------( 319      ( 19 Oct 2021 07:18 )             47.9                         15.2   18.40 )-----------( 346      ( 18 Oct 2021 15:57 )             45.5       Imaging personally reviewed by me:

## 2021-10-20 NOTE — BH CONSULTATION LIAISON PROGRESS NOTE - NSBHFUPINTERVALHXFT_PSY_A_CORE
Pt continues to complain of chest pain but also has much anxiety and insomnia.  Discussed starting Remeron 7.5mg po qhs to help with the insomnia and anxiety at night and he is reluctant to start yet but is considering. He has no thoughts of harming himself or others.

## 2021-10-20 NOTE — PROGRESS NOTE ADULT - SUBJECTIVE AND OBJECTIVE BOX
Patient is a 30y old  Male who presents with a chief complaint of chest pain (20 Oct 2021 13:40)    Being followed by ID for        Interval history:  pt with multiple complaints   chest still hurts  random complaints- right ear hurts - but then blames on TMJ  no midepigastric pain  concerned that has pneumonia   refused meds prescribed by psychiatrists   seen by hematology and GI  "I'm worried that I still have Covid"  No other acute events          PAST MEDICAL & SURGICAL HISTORY:  COVID-19 Jan 2021    No significant past surgical history      Allergies    No Known Allergies    Intolerances      Antimicrobials:      MEDICATIONS  (STANDING):  enoxaparin Injectable 40 milliGRAM(s) SubCutaneous daily  influenza   Vaccine 0.5 milliLiter(s) IntraMuscular once  lactobacillus acidophilus 1 Tablet(s) Oral daily  pantoprazole    Tablet 40 milliGRAM(s) Oral before breakfast  PARoxetine 20 milliGRAM(s) Oral daily      Vital Signs Last 24 Hrs  T(C): 36.9 (10-20-21 @ 10:54), Max: 37.1 (10-19-21 @ 19:26)  T(F): 98.4 (10-20-21 @ 10:54), Max: 98.8 (10-19-21 @ 19:26)  HR: 84 (10-20-21 @ 10:54) (69 - 84)  BP: 151/82 (10-20-21 @ 10:54) (117/72 - 151/82)  BP(mean): --  RR: 18 (10-20-21 @ 10:54) (18 - 18)  SpO2: 99% (10-20-21 @ 10:54) (97% - 99%)    Physical Exam:    Constitutional  pt anxious   HEENT PERRLA EOMI,No pallor or icterus    No oral exudate or erythema    Neck supple no JVD or LN    Chest Good AE,CTA    CVS  S1 S2     Abd soft BS normal No tenderness     Ext No cyanosis clubbing or edema    IV site no erythema tenderness or discharge    Joints no swelling or LOM    CNS AAO X 3 no focal    Lab Data:                          15.6   12.21 )-----------( 319      ( 19 Oct 2021 07:18 )             47.9       10-19    142  |  105  |  13  ----------------------------<  82  4.3   |  22  |  1.06    Ca    9.7      19 Oct 2021 07:18  Phos  4.8     10-19  Mg     2.4     10-19    TPro  7.4  /  Alb  4.6  /  TBili  0.6  /  DBili  x   /  AST  21  /  ALT  34  /  AlkPhos  83  10-19    Amylase, Serum Total in AM (10.20.21 @ 06:24)    Amylase, Serum Total: 30 U/L    Lipase, Serum in AM (10.20.21 @ 06:24)    Lipase, Serum: 19 U/L      < from: CT Angio Heart and Coronaries w/ IV Cont (10.19.21 @ 11:54) >  IMPRESSION:    Total coronary artery calcium score: 0.    Limited exam due to suboptimal contrast bolus timing. The distal aspects of the coronary arteries cannot be well evaluated. No obvious obstructive coronary artery disease identified.    --- End of Report ---          < end of copied text >      .Blood Blood-Peripheral  10-19-21   No growth to date.  --  --        < from: CT Angio Chest PE Protocol w/ IV Cont (10.18.21 @ 18:15) >    EXAM:  CT ANGIO CHEST PULM [facility]                            PROCEDURE DATE:  10/18/2021            INTERPRETATION:  CLINICAL INFORMATION: Chest pain and shortness of breath. Clinical concern for pulmonary embolism.    COMPARISON: None.    CONTRAST/COMPLICATIONS:  IV Contrast: Omnipaque 350  75 cc administered   25 cc discarded  Oral Contrast: None  Complications: None    PROCEDURE:  CT Angiography of the Chest.  Sagittal and coronal reformats were performed as well as 3D (MIP) reconstructions.    FINDINGS:    LUNGS AND AIRWAYS: Evaluation degraded by respiratory motion. Patent central airways.  No lung consolidation.  PLEURA: No pleural effusion.  MEDIASTINUM AND DEJA: No lymphadenopathy.  VESSELS: Evaluation of the segmental and subsegmental pulmonary arteries is limited secondary to poor contrast opacification and respiratory motion.No filling defect in the pulmonary artery or its branches to the lobar level.  HEART: Heart size is normal. No pericardial effusion.  CHEST WALL AND LOWER NECK: Within normal limits.  VISUALIZED UPPER ABDOMEN: Within normal limits.  BONES: No aggressive bone lesions.    IMPRESSION:    Study degraded by suboptimal contrast bolus and respiratory motion. No filling defect in the pulmonary artery or its branches to the lobar level. Segmental and subsegmental vessels are not adequately evaluated.    --- End of Report ---      FILI ROCHA MD; Resident Radiology  This document has been electronically signed.   PASCALE PARMAR M.D., Attending Radiologist  This document has been electronically signed. Oct 19 2021  8:30AM    < end of copied text >        WBC Count: 12.21 (10-19-21 @ 07:18)  WBC Count: 18.40 (10-18-21 @ 15:57)  WBC Count: 11.31 (10-15-21 @ 11:46)

## 2021-10-20 NOTE — DISCHARGE NOTE PROVIDER - PROVIDER TOKENS
PROVIDER:[TOKEN:[[id number]],FOLLOWUP:[Routine],ESTABLISHEDPATIENT:[T]],PROVIDER:[TOKEN:[[id number]],FOLLOWUP:[Routine],ESTABLISHEDPATIENT:[T]] PROVIDER:[TOKEN:[[id number]],FOLLOWUP:[Routine],ESTABLISHEDPATIENT:[T]],PROVIDER:[TOKEN:[[id number]],FOLLOWUP:[Routine],ESTABLISHEDPATIENT:[T]],PROVIDER:[TOKEN:[[id number]]]

## 2021-10-20 NOTE — DISCHARGE NOTE PROVIDER - HOSPITAL COURSE
31 yo male with PMH of COVID-19 infection in Jan 2021 who presents with subacute worsening of his is intermittent chest pain over last few weeks. He states ever since his COVID-19 infection, he has not felt the same and has had intermittent sharp/burning intermittent chest pains along with GI upset. He describes the chest pain as sometimes sharp and sometimes burning both in his anterior left chest wall and anterior right chest wall, reproducible with palpation. Exacerbated with certain movements, positional changes, and sometimes after eating. His symptoms associated with subjective fever, migraine headaches, occasional lightheadedness, sore throat, nausea, dry heaving, and belching. Denies any recent sick contacts, cough, dyspnea, diarrhea, constipation, dysuria nor frequency. Of note, he recently had b/l earache for which he was taking doxycycline for since 10/12/21 (last dose this morning) with symptoms resolved. Patient was sent into the ED by his PCP for further evaluation.    Dx: Chest Pain- low suspicion for cardiac etiology / suspect MSK vs. GI/GERD         Trops > 6 > 6 , EKG no ischemic changes, CTA negative for PE, CT Chest negative for infectious etiology        Leukocytosis/ +parainfluenza        Anxiety - seen by steven and recommended for outpatient follow up, c/w Paxil and Ativan PRN    Stable for discharge and to follow up OP with PCP and psych.    29 yo male with PMH of COVID-19 infection in Jan 2021 who presents with subacute worsening of his is intermittent chest pain over last few weeks. He states ever since his COVID-19 infection, he has not felt the same and has had intermittent sharp/burning intermittent chest pains along with GI upset. He describes the chest pain as sometimes sharp and sometimes burning both in his anterior left chest wall and anterior right chest wall, reproducible with palpation. Exacerbated with certain movements, positional changes, and sometimes after eating. His symptoms associated with subjective fever, migraine headaches, occasional lightheadedness, sore throat, nausea, dry heaving, and belching. Denies any recent sick contacts, cough, dyspnea, diarrhea, constipation, dysuria nor frequency. Of note, he recently had b/l earache for which he was taking doxycycline for since 10/12/21 (last dose this morning) with symptoms resolved. Patient was sent into the ED by his PCP for further evaluation.    Dx: Chest Pain- low suspicion for cardiac etiology / suspect MSK vs. GI/GERD         Trops > 6 > 6 , EKG no ischemic changes, CTA negative for PE, CT Chest negative for infectious etiology        Leukocytosis/ +parainfluenza        Anxiety - seen by steven and recommended for outpatient follow up, c/w Paxil and Ativan PRN    Stable for discharge and to follow up OP with PCP and psych.   He was seen by Card, ID, GI, Hm/Onc and Psych. He was found to have parainfluenza infection. Repeat COVID-19 is neg, and CXR is unremarkable. He is hemodynamically stable to be discharged home today.

## 2021-10-20 NOTE — DISCHARGE NOTE NURSING/CASE MANAGEMENT/SOCIAL WORK - PATIENT PORTAL LINK FT
You can access the FollowMyHealth Patient Portal offered by [facility] by registering at the following website: [email or web address]. By joining [facility]’s FollowMyHealth portal, you will also be able to view your health information using other applications (apps) compatible with our system.

## 2021-10-20 NOTE — DISCHARGE NOTE PROVIDER - NSDCCPCAREPLAN_GEN_ALL_CORE_FT
PRINCIPAL DISCHARGE DIAGNOSIS  Diagnosis: Chest pain  Assessment and Plan of Treatment: No evidence of acute coronary syndrome  No evidence of pulmonary embolism  No evidence of pulmonary infectious etiology      SECONDARY DISCHARGE DIAGNOSES  Diagnosis: Anxiety  Assessment and Plan of Treatment: Continue with Paxil daily for anxiety  Follow up with psychiaty after discharge    Diagnosis: Parainfluenza  Assessment and Plan of Treatment: Continue with supportive treatment  Tylenol for pain and fever  Robitussin for cough     PRINCIPAL DISCHARGE DIAGNOSIS  Diagnosis: Chest pain  Assessment and Plan of Treatment: No evidence of acute coronary syndrome  No evidence of pulmonary embolism  No evidence of pulmonary infectious etiology      SECONDARY DISCHARGE DIAGNOSES  Diagnosis: Anxiety  Assessment and Plan of Treatment: Continue with Paxil daily for anxiety  Follow up with psychiaty after discharge    Diagnosis: Parainfluenza  Assessment and Plan of Treatment: Continue with supportive treatment  Tylenol for pain and fever  Robitussin for cough    Diagnosis: GERD (gastroesophageal reflux disease)  Assessment and Plan of Treatment: HOME CARE INSTRUCTIONS  Change the factors that you can control. Ask your caregiver for guidance concerning weight loss, quitting smoking, and alcohol consumption.  Avoid foods and drinks that make your symptoms worse, such as:   Caffeine or alcoholic drinks.   Chocolate.   Peppermint or mint flavorings.  Garlic and onions.  Spicy foods.   Citrus fruits, such as oranges, diana, or limes.   Tomato-based foods such as sauce, chili, salsa, and pizza.  Fried and fatty foods.  Avoid lying down for the 3 hours prior to your bedtime or prior to taking a nap.  Eat small, frequent meals instead of large meals.   Wear loose-fitting clothing. Do not wear anything tight around your waist that causes pressure on your stomach.  Raise the head of your bed 6 to 8 inches with wood blocks to help you sleep. Extra pillows will not help.  Only take over-the-counter or prescription medicines for pain, discomfort, or fever as directed by your caregiver.   Do not take aspirin, ibuprofen, or other nonsteroidal anti-inflammatory drugs (NSAIDs).  SEEK IMMEDIATE MEDICAL CARE IF:  You have pain in your arms, neck, jaw, teeth, or back.   Your pain increases or changes in intensity or duration.   You develop nausea, vomiting, or sweating (diaphoresis).  You develop shortness of breath, or you faint.  Your vomit is green, yellow, black, or looks like coffee grounds or blood.  Your stool is red, bloody, or black.  These symptoms could be signs of other problems, such as heart disease, gastric bleeding, or esophageal bleeding.       PRINCIPAL DISCHARGE DIAGNOSIS  Diagnosis: Chest pain  Assessment and Plan of Treatment: No evidence of acute coronary syndrome  No evidence of pulmonary embolism  No evidence of pulmonary infectious etiology      SECONDARY DISCHARGE DIAGNOSES  Diagnosis: Anxiety  Assessment and Plan of Treatment: Continue with Paxil once a day and Ativan two times a day as needed for anxiety  Follow up with psychiatry after discharge    Diagnosis: Parainfluenza  Assessment and Plan of Treatment: Continue with supportive treatment  Tylenol for pain and fever  Robitussin for cough    Diagnosis: GERD (gastroesophageal reflux disease)  Assessment and Plan of Treatment: HOME CARE INSTRUCTIONS  Change the factors that you can control. Ask your caregiver for guidance concerning weight loss, quitting smoking, and alcohol consumption.  Avoid foods and drinks that make your symptoms worse, such as:   Caffeine or alcoholic drinks.   Chocolate.   Peppermint or mint flavorings.  Garlic and onions.  Spicy foods.   Citrus fruits, such as oranges, diana, or limes.   Tomato-based foods such as sauce, chili, salsa, and pizza.  Fried and fatty foods.  Avoid lying down for the 3 hours prior to your bedtime or prior to taking a nap.  Eat small, frequent meals instead of large meals.   Wear loose-fitting clothing. Do not wear anything tight around your waist that causes pressure on your stomach.  Raise the head of your bed 6 to 8 inches with wood blocks to help you sleep. Extra pillows will not help.  Only take over-the-counter or prescription medicines for pain, discomfort, or fever as directed by your caregiver.   Do not take aspirin, ibuprofen, or other nonsteroidal anti-inflammatory drugs (NSAIDs).  SEEK IMMEDIATE MEDICAL CARE IF:  You have pain in your arms, neck, jaw, teeth, or back.   Your pain increases or changes in intensity or duration.   You develop nausea, vomiting, or sweating (diaphoresis).  You develop shortness of breath, or you faint.  Your vomit is green, yellow, black, or looks like coffee grounds or blood.  Your stool is red, bloody, or black.  These symptoms could be signs of other problems, such as heart disease, gastric bleeding, or esophageal bleeding.

## 2021-10-20 NOTE — DISCHARGE NOTE PROVIDER - CARE PROVIDERS DIRECT ADDRESSES
[email or web address],DirectAddress_Unknown [email or web address],DirectAddress_Unknown,DirectAddress_Unknown

## 2021-10-20 NOTE — DISCHARGE NOTE PROVIDER - CARE PROVIDER_API CALL
Prieto Vazquez (MD)  Cardiology; Internal Medicine  [address] [facility], [address]  [place] [address]  Phone: [phone]  Fax: [phone]  Established Patient  Follow Up Time: Routine    Lucas Pang ()  Cardiovascular Disease; Internal Medicine; Nuclear Cardiology  [address] [facility], [address]  [place] [address]  Phone: [phone]  Fax: [phone]  Established Patient  Follow Up Time: Routine   Prieto Vazquez (MD)  Cardiology; Internal Medicine  [address] [facility], [address]  [place] [address]  Phone: [phone]  Fax: [phone]  Established Patient  Follow Up Time: Routine    Lucas Pang ()  Cardiovascular Disease; Internal Medicine; Nuclear Cardiology  [address] [facility], [address]  [place] [address]  Phone: [phone]  Fax: [phone]  Established Patient  Follow Up Time: Routine    Mal Stiles)  Internal Medicine  [address] [place] [address]  Phone: [phone]  Fax: [phone]  Follow Up Time:

## 2021-10-20 NOTE — PROGRESS NOTE ADULT - ASSESSMENT
31 yo male with PMH of COVID-19 infection in Jan 2021 who presents with subacute worsening of his is intermittent chest pain over last few weeks. He states ever since his COVID-19 infection, he has not felt the same and has had intermittent sharp/burning intermittent chest pains along with GI upset. He describes the chest pain as sometimes sharp and sometimes burning both in his anterior left chest wall and anterior right chest wall, reproducible with palpation. Exacerbated with certain movements, positional changes, and sometimes after eating. His symptoms associated with subjective fever, migraine headaches, occasional lightheadedness, sore throat, nausea, dry heaving, and belching. Denies any recent sick contacts, cough, dyspnea, diarrhea, constipation, dysuria nor frequency. Of note, he recently had b/l earache for which he was taking doxycycline for since 10/12/21 (last dose this morning) with symptoms resolved. Patient was sent into the ED by his PCP for further evaluation.    In the ED, vitals unremarkable. Labs notable for leukocytosis to 18K, trop neg x 2, COVID-19 PCR negative. CXR with clear lungs. Admitted under Dr. Pang's service for further work-up.  (18 Oct 2021 18:49)  ADDITIONAL HISTORY pt notes that he doesn't know where he was exposed to the parainfluenza  He claims that he doesn't leave house and  stays in bed most of the day. He notes that his 1/1/1 yo child does not go to . His wife is OT in a school. Pt claims he goes to doctor appointments almost every day. Pt had trouble pinpointing when things became worse . He came to ER though for chest pain, reproducible with touching chest. He denies diarrhea or urinary sxs. He notes chronic throat pain. He notes bifrontal headache. He claims his weight has gone from 296--> 235 pounds.  Pt kept asking if he was going to die and then would cry saying that he has a little baby at home.  Pt claims that his taste is normal but he has no appetite and that his stomach hurts him.  Pt was upset that examiner was leaving. WIll you come back? I want to be home for my birthday and cried again. Ears were bothering patient but not any more. Pt notes he showers twice a day and uses Q tips. Pt notes some sinus dripping      A/P    #PARAINFLUENZA VIRUS  HARD to tell how long patient has been sick- not clear from his history  He is in doctors offices frequently so perhaps picked up from there. WIfe works in schools but isn't sick  Hard to know if related to chest pain  lungs are clear  throat pain could be related   supportive care    #CHEST PAIN  ? costochondritis   troponins are not elevated  work up negative so far      # ABDOMINAL PAIN / WEIGHT LOSS  appreciate GI evaluation  Consider investigation for H Pylori  normal TSH, TFTs  normal  amylase , lipase   ? related to anxiety    #ELEVATED wbc  shouldn't be from the parainfluenza virus  Pt notes this is not new and that he has seen a hematologist for this  Can check blood cultures but no evidence of sepsis . No left shift No fevers   check cortisol level  heme follow up appreciated  will follow as an outpatient     #SORE THROAT, EAR PAIN   uses Q tips regularly  , normal TM  visible tonsils   throat culture - pending   check ASLO  check EBV serology  ?ENT input if persists    #ANXIETY / DEPRESSSION  Pt has crippling anxiety. psych in put appreciated   Pt unable to work or function b/o this.      There is no ID contraindication to discharge and complete workup as an outpatient     Dolores Gould M.D. ,   Pager [phone]     after 5PM/ weekends [phone]    Assessment and plan discussed with the primary team .

## 2021-10-20 NOTE — CONSULT NOTE ADULT - ASSESSMENT
Mild leukcoytosis. most likely reactive, in setting of + parainfluenza and RVP. Nl hgb and plt count  - will check esr, spep/ipep, ldh.  - will follow up on results of recent ct scans done as outpt.  - Will follow up with hematologist who patient saw as outpt.

## 2021-10-20 NOTE — DISCHARGE NOTE PROVIDER - NSDCFUADDAPPT_GEN_ALL_CORE_FT
[facility]   [address] [place], [place] [address]  Phone:[phone]

## 2021-10-20 NOTE — DISCHARGE NOTE NURSING/CASE MANAGEMENT/SOCIAL WORK - NSDCFUADDAPPT_GEN_ALL_CORE_FT
[facility]   [address] [place], [place] [address]  Phone:[phone]

## 2021-10-20 NOTE — PROGRESS NOTE ADULT - SUBJECTIVE AND OBJECTIVE BOX
DATE OF SERVICE: 10-20-21     Patient is a 30y old  Male who presents with a chief complaint of chest pain (20 Oct 2021 16:10)      INTERVAL HISTORY: feels ok, still anxious    TELEMETRY Personally reviewed: nsr      PHYSICAL EXAM:  T(C): 36.9 (10-20-21 @ 20:37), Max: 36.9 (10-20-21 @ 10:54)  HR: 82 (10-20-21 @ 20:37) (69 - 84)  BP: 132/84 (10-20-21 @ 20:37) (117/72 - 151/82)  RR: 18 (10-20-21 @ 20:37) (18 - 18)  SpO2: 96% (10-20-21 @ 20:37) (96% - 99%)  Wt(kg): --  I&O's Summary    19 Oct 2021 07:01  -  20 Oct 2021 07:00  --------------------------------------------------------  IN: 240 mL / OUT: 0 mL / NET: 240 mL    20 Oct 2021 07:01  -  20 Oct 2021 20:59  --------------------------------------------------------  IN: 120 mL / OUT: 0 mL / NET: 120 mL          Appearance: In no distress	  HEENT:    PERRL, EOMI	  Cardiovascular:  S1 S2, No JVD  Respiratory: Lungs clear to auscultation	  Gastrointestinal:  Soft, Non-tender, + BS	  Vascularature:  No edema of LE  Psychiatric: Appropriate affect   Neuro: no acute focal deficits                               15.6   12.21 )-----------( 319      ( 19 Oct 2021 07:18 )             47.9     10-19    142  |  105  |  13  ----------------------------<  82  4.3   |  22  |  1.06    Ca    9.7      19 Oct 2021 07:18  Phos  4.8     10-19  Mg     2.4     10-19    TPro  7.4  /  Alb  4.6  /  TBili  0.6  /  DBili  x   /  AST  21  /  ALT  34  /  AlkPhos  83  10-19        Labs personally reviewed      ASSESSMENT/PLAN: 	    29 yo male with PMH of COVID-19 infection in Jan 2021 who presents with subacute worsening of his is intermittent chest pain over last few weeks.    Problem/Plan - 1:  ·  Problem: Atypical chest pain.   ·  Plan: less likely cardiac  - f/u CTA chest to r/o PE as noted above   - CT coronaries performed - unremarkable     Problem/Plan - 2:  ·  Problem: Leukocytosis.   ·  Plan: has history of persistent leukocytosis for which he had seen hematology for.  - leukocytosis decreased  to 12K today  - +parainfluzenza  - check procal    - hematology eval - Dr Croft appreciated  - ID eval appreciated   - supportive care     Problem/Plan - 3:  ·  Problem: Anxiety.  ·  Plan: very anxious on exam. several times pausing to ask "am I going to die?" or "please tell me I'm not going to die" for reassurance.  - was prescribed escitalopram 5mg daily by his PCP recently but states he has not been taking it as he had adverse side effects from another medication (cymbalta)  - Psychiatry eval noted  - start Paxil and Ativan    Problem/Plan - 4:  ·  Problem: GERD (gastroesophageal reflux disease).  ·  Plan: takes omeprazole on and off. has been off it for few weeks now  - states had EGD that was unremarkable  - start pantoprazole 40mg daily  - maalox PRN.- GI eval Dr Stiles    Problem/Plan - 5:  ·  Problem: Prophylactic measure.   ·  Plan: DVT ppx: lovenox      I had a multiple prolonged conversations with the patient, his father, mother outpt PCP regarding hospital course, differential diagnosis and results of diagnostic tests.  Plan of care discussed with patient after the evaluation. Patient expresses clear understanding and satisfaction with the plan of care. Eighty minutes spent on encounter, of which more than fifty percent of the encounter was spent on counseling and/or coordinating care by the attending physician.      Lucas Pang DO [facility]  Cardiovascular Medicine  [address] [place] [address], [address]  Office: [phone]  Cell: [phone]

## 2021-10-20 NOTE — DISCHARGE NOTE PROVIDER - NSDCMRMEDTOKEN_GEN_ALL_CORE_FT
acetaminophen 325 mg oral tablet: 2 tab(s) orally every 6 hours, As needed, Temp greater or equal to 38C (100.4F), Mild Pain (1 - 3)  Align 4 mg oral capsule: 1 cap(s) orally once a day  aluminum hydroxide-magnesium hydroxide 200 mg-200 mg/5 mL oral suspension: 30 milliliter(s) orally every 4 hours, As needed, Dyspepsia  melatonin 3 mg oral tablet: 1 tab(s) orally once a day (at bedtime), As needed, Insomnia  omeprazole 20 mg oral delayed release capsule: 1 cap(s) orally once a day  PARoxetine 20 mg oral tablet: 1 tab(s) orally once a day   acetaminophen 325 mg oral tablet: 2 tab(s) orally every 6 hours, As needed, Temp greater or equal to 38C (100.4F), Mild Pain (1 - 3)  Align 4 mg oral capsule: 1 cap(s) orally once a day  aluminum hydroxide-magnesium hydroxide 200 mg-200 mg/5 mL oral suspension: 30 milliliter(s) orally every 4 hours, As needed, Dyspepsia  melatonin 3 mg oral tablet: 1 tab(s) orally once a day (at bedtime), As needed, Insomnia  pantoprazole 40 mg oral delayed release tablet: 1 tab(s) orally once a day (before a meal)  PARoxetine 20 mg oral tablet: 1 tab(s) orally once a day   acetaminophen 325 mg oral tablet: 2 tab(s) orally every 6 hours, As needed, Temp greater or equal to 38C (100.4F), Mild Pain (1 - 3)  Align 4 mg oral capsule: 1 cap(s) orally once a day  aluminum hydroxide-magnesium hydroxide 200 mg-200 mg/5 mL oral suspension: 30 milliliter(s) orally every 4 hours, As needed, Dyspepsia  LORazepam 0.5 mg oral tablet: 1 tab(s) orally 2 times a day, As needed, Anxiety MDD:2 tablets  melatonin 3 mg oral tablet: 1 tab(s) orally once a day (at bedtime), As needed, Insomnia  pantoprazole 40 mg oral delayed release tablet: 1 tab(s) orally once a day (before a meal)  PARoxetine 20 mg oral tablet: 1 tab(s) orally once a day

## 2021-10-21 ENCOUNTER — NON-APPOINTMENT (OUTPATIENT)
Age: 31
End: 2021-10-21

## 2021-10-21 VITALS — TEMPERATURE: 98 F

## 2021-10-21 LAB
CULTURE RESULTS: SIGNIFICANT CHANGE UP
SARS-COV-2 RNA SPEC QL NAA+PROBE: SIGNIFICANT CHANGE UP
SPECIMEN SOURCE: SIGNIFICANT CHANGE UP

## 2021-10-21 PROCEDURE — 71045 X-RAY EXAM CHEST 1 VIEW: CPT | Mod: 26

## 2021-10-21 PROCEDURE — 80053 COMPREHEN METABOLIC PANEL: CPT

## 2021-10-21 PROCEDURE — 75574 CT ANGIO HRT W/3D IMAGE: CPT

## 2021-10-21 PROCEDURE — 83690 ASSAY OF LIPASE: CPT

## 2021-10-21 PROCEDURE — 86769 SARS-COV-2 COVID-19 ANTIBODY: CPT

## 2021-10-21 PROCEDURE — 84145 PROCALCITONIN (PCT): CPT

## 2021-10-21 PROCEDURE — U0003: CPT

## 2021-10-21 PROCEDURE — 85025 COMPLETE CBC W/AUTO DIFF WBC: CPT

## 2021-10-21 PROCEDURE — 71275 CT ANGIOGRAPHY CHEST: CPT | Mod: MA

## 2021-10-21 PROCEDURE — 36415 COLL VENOUS BLD VENIPUNCTURE: CPT

## 2021-10-21 PROCEDURE — 99285 EMERGENCY DEPT VISIT HI MDM: CPT | Mod: 25

## 2021-10-21 PROCEDURE — 93005 ELECTROCARDIOGRAM TRACING: CPT

## 2021-10-21 PROCEDURE — U0005: CPT

## 2021-10-21 PROCEDURE — 0225U NFCT DS DNA&RNA 21 SARSCOV2: CPT

## 2021-10-21 PROCEDURE — G0378: CPT

## 2021-10-21 PROCEDURE — 87040 BLOOD CULTURE FOR BACTERIA: CPT

## 2021-10-21 PROCEDURE — 87081 CULTURE SCREEN ONLY: CPT

## 2021-10-21 PROCEDURE — 99232 SBSQ HOSP IP/OBS MODERATE 35: CPT

## 2021-10-21 PROCEDURE — 71046 X-RAY EXAM CHEST 2 VIEWS: CPT

## 2021-10-21 PROCEDURE — 83735 ASSAY OF MAGNESIUM: CPT

## 2021-10-21 PROCEDURE — 71045 X-RAY EXAM CHEST 1 VIEW: CPT

## 2021-10-21 PROCEDURE — 84100 ASSAY OF PHOSPHORUS: CPT

## 2021-10-21 PROCEDURE — 82150 ASSAY OF AMYLASE: CPT

## 2021-10-21 PROCEDURE — 84484 ASSAY OF TROPONIN QUANT: CPT

## 2021-10-21 RX ADMIN — PANTOPRAZOLE SODIUM 40 MILLIGRAM(S): 20 TABLET, DELAYED RELEASE ORAL at 05:29

## 2021-10-21 RX ADMIN — Medication 20 MILLIGRAM(S): at 11:03

## 2021-10-21 RX ADMIN — ENOXAPARIN SODIUM 40 MILLIGRAM(S): 100 INJECTION SUBCUTANEOUS at 11:03

## 2021-10-21 RX ADMIN — Medication 1 TABLET(S): at 11:03

## 2021-10-21 NOTE — BH CONSULTATION LIAISON PROGRESS NOTE - NSBHFUPINTERVALHXFT_PSY_A_CORE
The patient has been concerned throughout his hospital stay thinking that he has COVID19. He is concerned his recurrent chest pain and the new onset abdominal pain is due to COVID19, and he requests a chest Xray and a covid swab, which was sent this morning. The patient states that he wants to go home and be with his wife and daughter. He denies any hallucinations, manic episodes, or SI/HI. He is still anxious and states that he is just as worried about his health as he was when he was first admitted.  team requested psych f/u for anxiety, med mgt, possible need for psych admission. Writer covering for Dr. Cobos today. The patient has been concerned throughout his hospital stay thinking that he has COVID19. He is concerned his recurrent chest pain and the new onset abdominal pain is due to COVID19, and he requests a chest Xray and a covid swab, which was sent this morning. The patient states that he wants to go home and be with his wife and daughter. He denies any hallucinations, manic episodes, or SI/HI. He is still anxious and states that he is just as worried about his health as he was when he was first admitted.

## 2021-10-21 NOTE — BH CONSULTATION LIAISON PROGRESS NOTE - NSBHCHARTREVIEWVS_PSY_A_CORE FT
Vital Signs Last 24 Hrs  T(C): 36.6 (21 Oct 2021 05:31), Max: 36.9 (20 Oct 2021 20:37)  T(F): 97.8 (21 Oct 2021 05:31), Max: 98.4 (20 Oct 2021 20:37)  HR: 70 (21 Oct 2021 05:31) (70 - 82)  BP: 140/73 (21 Oct 2021 05:31) (132/84 - 140/73)  BP(mean): --  RR: 18 (21 Oct 2021 05:31) (18 - 18)  SpO2: 98% (21 Oct 2021 05:31) (96% - 98%)

## 2021-10-21 NOTE — PROGRESS NOTE ADULT - ASSESSMENT
1. Atypical chest pain, no other associated GI symptoms. Likely costochondritis. Supportive care    2. Anxiety  -appreciate psych input    3. Parainfluenza     4. Functional dyspepsia. Had EGD, flex sig and recent CT (done 2 months ago at [facility], per report normal)  -continue PPI    5. Leukocytosis  -appreciate Heme

## 2021-10-21 NOTE — BH CONSULTATION LIAISON PROGRESS NOTE - NSBHASSESSMENTFT_PSY_ALL_CORE
The patient is a 29 yo  man with PMH of COVID-19 infection in Jan 2021 who presents with subacute worsening of his is intermittent chest pain over last few weeks. He states ever since his COVID-19 infection, he has not felt the same and has had intermittent sharp/burning intermittent chest pains along with GI upset.  Patient was sent into the ED by his PCP for further evaluation of hist chest pain. The patient has a history of a similar episode in April where he was admitted to the hospital for chest pain. After a thorough work up, the patient was discharged with the diagnosis of a panic attack. The patient states that he has a psychiatrist who he follows with. He is not on any medication for anxiety, however, he was on Cymbalta for a few days in the past, which was discontinued due to episodes of dizziness. The patient states that he has had difficulty sleeping, lost interest in daily activities, and has low energy and a decreased appetite since getting sick with COVID-19 in January. He repeatedly states that he doesn't want to die. He lives with his wife and 1 year old daughter at home, but states that he lays in bed and doesn't come in contact with them because he fears catching COVID19 from them again. The patient worked as an  in the past, but has not been able to work since getting COVID19 since he cannot concentrate. He states he used to interact with friends and visit family in the past, but since getting COVID19 in January he has been too anxious to leave the house. The patient denies any substance abuse. He has a family history of some psychiatric issues. His mother has a history of depression and he states his aunt hears voices and "believes she is allergic to electricity".  Patient denies ever having any symptoms of psychosis or jim.  He says he is hopeful about the future. He may benefit from small doses of Ativan for anxiety while admitted.
The patient is a 29 yo  man with PMH of COVID-19 infection in Jan 2021 who presents with subacute worsening of his intermittent chest pain over last few weeks. He states ever since his COVID-19 infection, he has not felt the same and has had intermittent sharp/burning intermittent chest pains along with GI upset.  Patient was sent into the ED by his PCP for further evaluation of hist chest pain. The patient has a history of a similar episode in April where he was admitted to the hospital for chest pain. After a thorough work up, the patient was discharged with the diagnosis of a panic attack. The patient states that he has a psychiatrist who he follows with. He is not on any medication for anxiety, however, he was on Cymbalta for a few days in the past, which was discontinued due to episodes of dizziness. The patient states that he has had difficulty sleeping, lost interest in daily activities, and has low energy and a decreased appetite since getting sick with COVID-19 in January. He repeatedly states that he doesn't want to die. He lives with his wife and 1 year old daughter at home, but states that he lays in bed and doesn't come in contact with them because he fears catching COVID19 from them again. The patient worked as an  in the past, but has not been able to work since getting COVID19 since he cannot concentrate. He states he used to interact with friends and visit family in the past, but since getting COVID19 in January he has been too anxious to leave the house. The patient denies any substance abuse. He has a family history of some psychiatric issues. His mother has a history of depression and he states his aunt hears voices and "believes she is allergic to electricity".  Patient denies ever having any symptoms of psychosis or jim.  He says he is hopeful about the future. He may benefit from small doses of Ativan for anxiety while admitted.

## 2021-10-21 NOTE — BH CONSULTATION LIAISON PROGRESS NOTE - CURRENT MEDICATION
MEDICATIONS  (STANDING):  enoxaparin Injectable 40 milliGRAM(s) SubCutaneous daily  influenza   Vaccine 0.5 milliLiter(s) IntraMuscular once  lactobacillus acidophilus 1 Tablet(s) Oral daily  pantoprazole    Tablet 40 milliGRAM(s) Oral before breakfast  PARoxetine 20 milliGRAM(s) Oral daily    MEDICATIONS  (PRN):  acetaminophen   Tablet .. 650 milliGRAM(s) Oral every 6 hours PRN Temp greater or equal to 38C (100.4F), Mild Pain (1 - 3)  aluminum hydroxide/magnesium hydroxide/simethicone Suspension 30 milliLiter(s) Oral every 4 hours PRN Dyspepsia  LORazepam     Tablet 0.5 milliGRAM(s) Oral two times a day PRN Anxiety  melatonin 3 milliGRAM(s) Oral at bedtime PRN Insomnia  ondansetron Injectable 4 milliGRAM(s) IV Push every 8 hours PRN Nausea and/or Vomiting  
MEDICATIONS  (STANDING):  enoxaparin Injectable 40 milliGRAM(s) SubCutaneous daily  influenza   Vaccine 0.5 milliLiter(s) IntraMuscular once  lactobacillus acidophilus 1 Tablet(s) Oral daily  pantoprazole    Tablet 40 milliGRAM(s) Oral before breakfast  PARoxetine 20 milliGRAM(s) Oral daily    MEDICATIONS  (PRN):  acetaminophen   Tablet .. 650 milliGRAM(s) Oral every 6 hours PRN Temp greater or equal to 38C (100.4F), Mild Pain (1 - 3)  aluminum hydroxide/magnesium hydroxide/simethicone Suspension 30 milliLiter(s) Oral every 4 hours PRN Dyspepsia  LORazepam     Tablet 0.5 milliGRAM(s) Oral two times a day PRN Anxiety  melatonin 3 milliGRAM(s) Oral at bedtime PRN Insomnia  ondansetron Injectable 4 milliGRAM(s) IV Push every 8 hours PRN Nausea and/or Vomiting

## 2021-10-21 NOTE — BH CONSULTATION LIAISON PROGRESS NOTE - CASE SUMMARY
The patient is a 31 yo  man with PMH of COVID-19 infection in Jan 2021 who presents with subacute worsening of his intermittent chest pain over last few weeks. He states ever since his COVID-19 infection, he has not felt the same and has had intermittent sharp/burning intermittent chest pains along with GI upset.  Patient was sent into the ED by his PCP for further evaluation of hist chest pain. today pt states he is anxious about his health, concerned he still has pneumonia, and has COVID again. Pt denies si/hi. pt does not warrant inpt psych admission at this time. pt can be d/c home, cont paxil. can consider increasing in oupt setting. pt can f/u at [facility]

## 2021-10-21 NOTE — PROGRESS NOTE ADULT - ATTENDING COMMENTS
Pt care and plan discussed and reviewed with NP. Plan as outlined above edited by me to reflect our discussion. I had a prolonged conversation with the patient multiple times today regarding hospital course, differential diagnosis and results of diagnostic tests.  Plan of care discussed with patient after the evaluation. Patient expresses clear understanding and satisfaction with the plan of care. OMT on six regions for acute somatic dysfunctions done at the bedside. Sixty five minutes spent on encounter, of which more than fifty percent of the encounter was spent on counseling and/or coordinating care by the attending physician.

## 2021-10-21 NOTE — BH CONSULTATION LIAISON PROGRESS NOTE - NSBHCONSULTFOLLOWAFTERCARE_PSY_A_CORE FT
PT would benefit from continued outpt psychiatric follow up.
pt can f/u at [facility]

## 2021-10-21 NOTE — PROGRESS NOTE ADULT - SUBJECTIVE AND OBJECTIVE BOX
Date of Service   10-21-21 @ 12:29    Patient is a 31y old  Male who presents with a chief complaint of chest pain (21 Oct 2021 04:18)      INTERVAL HISTORY:     TELEMETRY Personally reviewed:    REVIEW OF SYSTEMS:   CONSTITUTIONAL: No weakness  EYES/ENT: No visual changes; No throat pain  Neck: No pain or stiffness  Respiratory: No cough, wheezing, No shortness of breath  CARDIOVASCULAR: no chest pain or palpitations  GASTROINTESTINAL: No abdominal pain, no nausea, vomiting or hematemesis  GENITOURINARY: No dysuria, frequency or hematuria  NEUROLOGICAL: No stroke like symptoms  SKIN: No rashes    	  MEDICATIONS:        PHYSICAL EXAM:  T(C): 37 (10-21-21 @ 11:55), Max: 37 (10-21-21 @ 11:55)  HR: 72 (10-21-21 @ 11:55) (70 - 82)  BP: 132/84 (10-21-21 @ 11:55) (132/84 - 140/73)  RR: 18 (10-21-21 @ 11:55) (18 - 18)  SpO2: 98% (10-21-21 @ 11:55) (96% - 98%)  Wt(kg): --  I&O's Summary    20 Oct 2021 07:01  -  21 Oct 2021 07:00  --------------------------------------------------------  IN: 120 mL / OUT: 0 mL / NET: 120 mL          Appearance: In no distress	  HEENT:    PERRL, EOMI	  Cardiovascular:  S1 S2, No JVD  Respiratory: Lungs clear to auscultation	  Gastrointestinal:  Soft, Non-tender, + BS	  Vascularature:  No edema of LE  Psychiatric: Appropriate affect   Neuro: no acute focal deficits           Labs personally reviewed      ASSESSMENT/PLAN: 	    29 yo male with PMH of COVID-19 infection in Jan 2021 who presents with subacute worsening of his is intermittent chest pain over last few weeks.    Problem/Plan - 1:  ·  Problem: Atypical chest pain.   ·  Plan: less likely cardiac  - f/u CTA chest to r/o PE as noted above   - CT coronaries performed - unremarkable     Problem/Plan - 2:  ·  Problem: Leukocytosis.   ·  Plan: has history of persistent leukocytosis for which he had seen hematology for.  - leukocytosis decreased  to 12K today  - +parainfluzenza  - check procal    - hematology eval - Dr Croft appreciated  - ID eval appreciated   - supportive care     Problem/Plan - 3:  ·  Problem: Anxiety.  ·  Plan: very anxious on exam. several times pausing to ask "am I going to die?" or "please tell me I'm not going to die" for reassurance.  - was prescribed escitalopram 5mg daily by his PCP recently but states he has not been taking it as he had adverse side effects from another medication (cymbalta)  - Psychiatry eval noted  - on Paxil and Ativan    Problem/Plan - 4:  ·  Problem: GERD (gastroesophageal reflux disease).  ·  Plan: takes omeprazole on and off. has been off it for few weeks now  - states had EGD that was unremarkable  - start pantoprazole 40mg daily  - maalox PRN.- GI eval Dr Stiles    Problem/Plan - 5:  ·  Problem: Prophylactic measure.   ·  Plan: DVT ppx: lovenox        Stanley Santana FN-BC   Lucas Pang DO [facility]  Cardiovascular Medicine  [address] [place] [address], [address]  Office: [phone]  Cell: [phone]

## 2021-10-21 NOTE — PROGRESS NOTE ADULT - ASSESSMENT
Mild leukcoytosis. most likely reactive, in setting of + parainfluenza and RVP. Nl hgb and plt count  - will check esr, spep/ipep, ldh.  - Ct scans as out pt of c/a/p show no evidence of malignancy.  - Heme work up performed as outpt by Dr. Mendel Warshawsky. I spoke with him yesterday. Prior blood work included flow cytometry which was unremarkable.    Work up of elevated wbc as per med, ID.   From heme standpoint no additional testing currently indicated.    With unremarkable hgb and lpt and cmp, can continue with observation for elevated WBC.   Mild leukcoytosis. most likely reactive, in setting of + parainfluenza and RVP. Nl hgb and plt count  - will check esr, spep/ipep, ldh.  - Ct scans as out pt of c/a/p show no evidence of malignancy.  - Heme work up performed as outpt by Dr. Mendel Warshawsky. I spoke with him yesterday. Prior blood work included flow cytometry which was unremarkable.    Work up of elevated wbc as per med, ID.   From heme standpoint no additional testing currently indicated.    With unremarkable hgb and plt and cmp, can continue with observation for elevated WBC.

## 2021-10-21 NOTE — PROGRESS NOTE ADULT - SUBJECTIVE AND OBJECTIVE BOX
DESI MCNAMARA  MRN-[id number]    Patient is a 31y old  Male who presents with a chief complaint of chest pain (20 Oct 2021 17:59)      Review of System  REVIEW OF SYSTEMS      General:	Denies fatigue, fevers, chills, sweats, decreased appetite.    Skin/Breast: denies pruritis, rash  	  Ophthalmologic: no change in vision or blurring  	  HEENT	Denies dry mouth, oral sores, dysphagia,  change in hearing.    Respiratory and Thorax:  cough, sob, wheeze, hemoptysis  	  Cardiovascular:	no cp , palp, orthopnea    Gastrointestinal:	no n/v/d constipation    Genitourinary:	no dysuria of frequency, no hematuria, no flank pain    Musculoskeletal:	no bone or joint pain. no muscle aches.     Neurological:	no change in sensory or motor function. no headache. no weakness.     Psychiatric:	no depression, no anxiety, insomnia.     Hematology/Lymphatics:	no bleeding or bruising        Current Meds  MEDICATIONS  (STANDING):  enoxaparin Injectable 40 milliGRAM(s) SubCutaneous daily  influenza   Vaccine 0.5 milliLiter(s) IntraMuscular once  lactobacillus acidophilus 1 Tablet(s) Oral daily  pantoprazole    Tablet 40 milliGRAM(s) Oral before breakfast  PARoxetine 20 milliGRAM(s) Oral daily    MEDICATIONS  (PRN):  acetaminophen   Tablet .. 650 milliGRAM(s) Oral every 6 hours PRN Temp greater or equal to 38C (100.4F), Mild Pain (1 - 3)  aluminum hydroxide/magnesium hydroxide/simethicone Suspension 30 milliLiter(s) Oral every 4 hours PRN Dyspepsia  LORazepam     Tablet 0.5 milliGRAM(s) Oral two times a day PRN Anxiety  melatonin 3 milliGRAM(s) Oral at bedtime PRN Insomnia  ondansetron Injectable 4 milliGRAM(s) IV Push every 8 hours PRN Nausea and/or Vomiting      Vitals  Vital Signs Last 24 Hrs  T(C): 36.9 (20 Oct 2021 20:37), Max: 36.9 (20 Oct 2021 10:54)  T(F): 98.4 (20 Oct 2021 20:37), Max: 98.4 (20 Oct 2021 10:54)  HR: 82 (20 Oct 2021 20:37) (80 - 84)  BP: 132/84 (20 Oct 2021 20:37) (132/74 - 151/82)  BP(mean): --  RR: 18 (20 Oct 2021 20:37) (18 - 18)  SpO2: 96% (20 Oct 2021 20:37) (96% - 99%)    Physical Exam  PHYSICAL EXAM:      Constitutional: NAD    Eyes: PERRLA EOMI, anicteric sclera    Heent :No oral sores, no pharyngeal injection. moist mucosa.    Neck: supple, no jvd, no LAD    Respiratory: CTA b/l     Cardiovascular: s1s2, no m/g/r    Gastrointestinal: soft, nt, nd, + BS    Extremities: no c/c/e    Neurological:A&O x 3 moves all ext.    Skin: no rash on exposed skin    Lymph Nodes: no lymphadenopathy.              Lab  CBC Full  -  ( 19 Oct 2021 07:18 )  WBC Count : 12.21 K/uL  RBC Count : 5.32 M/uL  Hemoglobin : 15.6 g/dL  Hematocrit : 47.9 %  Platelet Count - Automated : 319 K/uL  Mean Cell Volume : 90.0 fl  Mean Cell Hemoglobin : 29.3 pg  Mean Cell Hemoglobin Concentration : 32.6 gm/dL  Auto Neutrophil # : 7.98 K/uL  Auto Lymphocyte # : 2.90 K/uL  Auto Monocyte # : 1.09 K/uL  Auto Eosinophil # : 0.09 K/uL  Auto Basophil # : 0.05 K/uL  Auto Neutrophil % : 65.4 %  Auto Lymphocyte % : 23.8 %  Auto Monocyte % : 8.9 %  Auto Eosinophil % : 0.7 %  Auto Basophil % : 0.4 %    10-19    142  |  105  |  13  ----------------------------<  82  4.3   |  22  |  1.06    Ca    9.7      19 Oct 2021 07:18  Phos  4.8     10-19  Mg     2.4     10-19    TPro  7.4  /  Alb  4.6  /  TBili  0.6  /  DBili  x   /  AST  21  /  ALT  34  /  AlkPhos  83  10-19        Rad:    Assessment/Plan   DESI MCNAMARA  [id number]    Patient is a 31y old  Male who presents with a chief complaint of chest pain (20 Oct 2021 17:59)      Review of System    No new events.  Still reports chest discomfort    Current Meds  MEDICATIONS  (STANDING):  enoxaparin Injectable 40 milliGRAM(s) SubCutaneous daily  influenza   Vaccine 0.5 milliLiter(s) IntraMuscular once  lactobacillus acidophilus 1 Tablet(s) Oral daily  pantoprazole    Tablet 40 milliGRAM(s) Oral before breakfast  PARoxetine 20 milliGRAM(s) Oral daily    MEDICATIONS  (PRN):  acetaminophen   Tablet .. 650 milliGRAM(s) Oral every 6 hours PRN Temp greater or equal to 38C (100.4F), Mild Pain (1 - 3)  aluminum hydroxide/magnesium hydroxide/simethicone Suspension 30 milliLiter(s) Oral every 4 hours PRN Dyspepsia  LORazepam     Tablet 0.5 milliGRAM(s) Oral two times a day PRN Anxiety  melatonin 3 milliGRAM(s) Oral at bedtime PRN Insomnia  ondansetron Injectable 4 milliGRAM(s) IV Push every 8 hours PRN Nausea and/or Vomiting      Vital Signs Last 24 Hrs  T(C): 36.9 (20 Oct 2021 20:37), Max: 36.9 (20 Oct 2021 10:54)  T(F): 98.4 (20 Oct 2021 20:37), Max: 98.4 (20 Oct 2021 10:54)  HR: 82 (20 Oct 2021 20:37) (80 - 84)  BP: 132/84 (20 Oct 2021 20:37) (132/74 - 151/82)  BP(mean): --  RR: 18 (20 Oct 2021 20:37) (18 - 18)  SpO2: 96% (20 Oct 2021 20:37) (96% - 99%)      PHYSICAL EXAM:    NAD      Lab  CBC Full  -  ( 19 Oct 2021 07:18 )  WBC Count : 12.21 K/uL  RBC Count : 5.32 M/uL  Hemoglobin : 15.6 g/dL  Hematocrit : 47.9 %  Platelet Count - Automated : 319 K/uL  Mean Cell Volume : 90.0 fl  Mean Cell Hemoglobin : 29.3 pg  Mean Cell Hemoglobin Concentration : 32.6 gm/dL  Auto Neutrophil # : 7.98 K/uL  Auto Lymphocyte # : 2.90 K/uL  Auto Monocyte # : 1.09 K/uL  Auto Eosinophil # : 0.09 K/uL  Auto Basophil # : 0.05 K/uL  Auto Neutrophil % : 65.4 %  Auto Lymphocyte % : 23.8 %  Auto Monocyte % : 8.9 %  Auto Eosinophil % : 0.7 %  Auto Basophil % : 0.4 %    10-19    142  |  105  |  13  ----------------------------<  82  4.3   |  22  |  1.06    Ca    9.7      19 Oct 2021 07:18  Phos  4.8     10-19  Mg     2.4     10-19    TPro  7.4  /  Alb  4.6  /  TBili  0.6  /  DBili  x   /  AST  21  /  ALT  34  /  AlkPhos  83  10-19        Rad:    Assessment/Plan

## 2021-10-21 NOTE — PROGRESS NOTE ADULT - SUBJECTIVE AND OBJECTIVE BOX
Chief Complaint:  Patient is a 31y old  Male who presents with a chief complaint of chest pain (21 Oct 2021 12:28)      Date of service 10-21-21 @ 14:01      Interval Events:     Hospital Medications:  acetaminophen   Tablet .. 650 milliGRAM(s) Oral every 6 hours PRN  aluminum hydroxide/magnesium hydroxide/simethicone Suspension 30 milliLiter(s) Oral every 4 hours PRN  enoxaparin Injectable 40 milliGRAM(s) SubCutaneous daily  influenza   Vaccine 0.5 milliLiter(s) IntraMuscular once  lactobacillus acidophilus 1 Tablet(s) Oral daily  LORazepam     Tablet 0.5 milliGRAM(s) Oral two times a day PRN  melatonin 3 milliGRAM(s) Oral at bedtime PRN  ondansetron Injectable 4 milliGRAM(s) IV Push every 8 hours PRN  pantoprazole    Tablet 40 milliGRAM(s) Oral before breakfast  PARoxetine 20 milliGRAM(s) Oral daily        Review of Systems:  General:  No wt loss, fevers, chills, night sweats, fatigue,   Eyes:  Good vision, no reported pain  ENT:  No sore throat, pain, runny nose, dysphagia  CV:  No pain, palpitations, hypo/hypertension  Resp:  No dyspnea, cough, tachypnea, wheezing  GI:  See HPI  :  No pain, bleeding, incontinence, nocturia  Muscle:  No pain, weakness  Neuro:  No weakness, tingling, memory problems  Psych:  No fatigue, insomnia, mood problems, depression  Endocrine:  No polyuria, polydipsia, cold/heat intolerance  Heme:  No petechiae, ecchymosis, easy bruisability  Integumentary:  No rash, edema    PHYSICAL EXAM:   Vital Signs:  Vital Signs Last 24 Hrs  T(C): 37 (21 Oct 2021 11:55), Max: 37 (21 Oct 2021 11:55)  T(F): 98.6 (21 Oct 2021 11:55), Max: 98.6 (21 Oct 2021 11:55)  HR: 72 (21 Oct 2021 11:55) (70 - 82)  BP: 132/84 (21 Oct 2021 11:55) (132/84 - 140/73)  BP(mean): --  RR: 18 (21 Oct 2021 11:55) (18 - 18)  SpO2: 98% (21 Oct 2021 13:14) (96% - 98%)  Daily     Daily Weight in k.1 (21 Oct 2021 08:35)      PHYSICAL EXAM:     GENERAL:  Appears stated age, well-groomed, well-nourished, no distress  HEENT:  NC/AT,  conjunctivae anicteric, clear and pink,   NECK: supple, trachea midline  CHEST:  Full & symmetric excursion, no increased effort, breath sounds clear  HEART:  Regular rhythm, no JVD  ABDOMEN:  Soft, non-tender, non-distended, normoactive bowel sounds,  no masses , no hepatosplenomegaly  EXTREMITIES:  no cyanosis,clubbing or edema  SKIN:  No rash, erythema, or, ecchymoses, no jaundice  NEURO:  Alert, non-focal, no asterixis  PSYCH: Appropriate affect, oriented to place and time  RECTAL: Deferred      LABS Personally reviewed by me:                                          15.6   12.21 )-----------( 319      ( 19 Oct 2021 07:18 )             47.9                         15.2   18.40 )-----------( 346      ( 18 Oct 2021 15:57 )             45.5       Imaging personally reviewed by me:

## 2021-10-22 ENCOUNTER — TRANSCRIPTION ENCOUNTER (OUTPATIENT)
Age: 31
End: 2021-10-22

## 2021-10-24 LAB
CULTURE RESULTS: SIGNIFICANT CHANGE UP
CULTURE RESULTS: SIGNIFICANT CHANGE UP
SPECIMEN SOURCE: SIGNIFICANT CHANGE UP
SPECIMEN SOURCE: SIGNIFICANT CHANGE UP

## 2021-10-28 ENCOUNTER — NON-APPOINTMENT (OUTPATIENT)
Age: 31
End: 2021-10-28

## 2021-11-01 ENCOUNTER — APPOINTMENT (OUTPATIENT)
Dept: CARDIOLOGY | Facility: CLINIC | Age: 31
End: 2021-11-01
Payer: COMMERCIAL

## 2021-11-01 ENCOUNTER — NON-APPOINTMENT (OUTPATIENT)
Age: 31
End: 2021-11-01

## 2021-11-01 VITALS
SYSTOLIC BLOOD PRESSURE: 138 MMHG | HEART RATE: 129 BPM | BODY MASS INDEX: 34.8 KG/M2 | WEIGHT: 235 LBS | DIASTOLIC BLOOD PRESSURE: 70 MMHG | TEMPERATURE: 99.14 F | HEIGHT: 69 IN | OXYGEN SATURATION: 99 %

## 2021-11-01 PROCEDURE — 99214 OFFICE O/P EST MOD 30 MIN: CPT

## 2021-11-01 PROCEDURE — 93000 ELECTROCARDIOGRAM COMPLETE: CPT

## 2021-11-01 RX ORDER — OMEPRAZOLE 40 MG/1
40 CAPSULE, DELAYED RELEASE ORAL
Qty: 30 | Refills: 1 | Status: DISCONTINUED | COMMUNITY
Start: 2021-10-14 | End: 2021-11-01

## 2021-11-01 RX ORDER — ESCITALOPRAM OXALATE 5 MG/1
5 TABLET ORAL
Qty: 30 | Refills: 2 | Status: DISCONTINUED | COMMUNITY
Start: 2021-10-14 | End: 2021-11-01

## 2021-11-01 RX ORDER — DOXYCYCLINE 100 MG/1
100 TABLET, FILM COATED ORAL
Qty: 20 | Refills: 0 | Status: DISCONTINUED | COMMUNITY
Start: 2021-10-11 | End: 2021-11-01

## 2021-11-01 NOTE — HISTORY OF PRESENT ILLNESS
[FreeTextEntry1] : Edilson is a 30yo male who returns today for evaluation. Patient recently admitted to [facility] from 10/18-10/20 for chest pain, epigastric discomfort associated with subjective fever, migraine headaches, occasional lightheadedness, sore throat, nausea, dry heaving, and belching. Patient's workup for chest pain was negative, CTA chest negative for PE and cardiac etiology. Patient was found to be positive for Parainfluenza while inpatient.  He was advised to follow-up Outpatient with Psych. He has no additional issues or concerns for today.

## 2021-11-02 ENCOUNTER — TRANSCRIPTION ENCOUNTER (OUTPATIENT)
Age: 31
End: 2021-11-02

## 2021-11-04 ENCOUNTER — NON-APPOINTMENT (OUTPATIENT)
Age: 31
End: 2021-11-04

## 2021-12-02 DIAGNOSIS — U09.9 ANXIETY DISORDER, UNSPECIFIED: ICD-10-CM

## 2021-12-02 DIAGNOSIS — F41.9 ANXIETY DISORDER, UNSPECIFIED: ICD-10-CM

## 2021-12-13 ENCOUNTER — APPOINTMENT (OUTPATIENT)
Dept: CARDIOLOGY | Facility: CLINIC | Age: 31
End: 2021-12-13

## 2021-12-15 ENCOUNTER — RX RENEWAL (OUTPATIENT)
Age: 31
End: 2021-12-15

## 2021-12-15 ENCOUNTER — NON-APPOINTMENT (OUTPATIENT)
Age: 31
End: 2021-12-15

## 2021-12-23 ENCOUNTER — APPOINTMENT (OUTPATIENT)
Dept: CARDIOLOGY | Facility: CLINIC | Age: 31
End: 2021-12-23

## 2022-01-02 LAB
ALBUMIN MFR SERPL ELPH: 55.4 %
ALBUMIN SERPL ELPH-MCNC: 4.8 G/DL
ALBUMIN SERPL ELPH-MCNC: 4.9 G/DL
ALBUMIN SERPL ELPH-MCNC: 5 G/DL
ALBUMIN SERPL-MCNC: 4.3 G/DL
ALBUMIN/GLOB SERPL: 1.2 RATIO
ALP BLD-CCNC: 84 U/L
ALP BLD-CCNC: 84 U/L
ALP BLD-CCNC: 87 U/L
ALPHA1 GLOB MFR SERPL ELPH: 4.2 %
ALPHA1 GLOB SERPL ELPH-MCNC: 0.3 G/DL
ALPHA2 GLOB MFR SERPL ELPH: 9.6 %
ALPHA2 GLOB SERPL ELPH-MCNC: 0.7 G/DL
ALT SERPL-CCNC: 29 U/L
ALT SERPL-CCNC: 33 U/L
ALT SERPL-CCNC: 38 U/L
AMYLASE/CREAT SERPL: 49 U/L
ANION GAP SERPL CALC-SCNC: 12 MMOL/L
ANION GAP SERPL CALC-SCNC: 13 MMOL/L
ANION GAP SERPL CALC-SCNC: 14 MMOL/L
ANION GAP SERPL CALC-SCNC: 16 MMOL/L
APPEARANCE: CLEAR
AST SERPL-CCNC: 19 U/L
AST SERPL-CCNC: 23 U/L
AST SERPL-CCNC: 24 U/L
B BURGDOR AB SER-IMP: NEGATIVE
B BURGDOR IGM PATRN SER IB-IMP: NEGATIVE
B BURGDOR18KD IGG SER QL IB: NORMAL
B BURGDOR23KD IGG SER QL IB: NORMAL
B BURGDOR23KD IGM SER QL IB: NORMAL
B BURGDOR28KD IGG SER QL IB: NORMAL
B BURGDOR30KD IGG SER QL IB: NORMAL
B BURGDOR31KD IGG SER QL IB: NORMAL
B BURGDOR39KD IGG SER QL IB: NORMAL
B BURGDOR39KD IGM SER QL IB: NORMAL
B BURGDOR41KD IGG SER QL IB: NORMAL
B BURGDOR41KD IGM SER QL IB: NORMAL
B BURGDOR45KD IGG SER QL IB: NORMAL
B BURGDOR58KD IGG SER QL IB: NORMAL
B BURGDOR66KD IGG SER QL IB: NORMAL
B BURGDOR93KD IGG SER QL IB: NORMAL
B-GLOBULIN MFR SERPL ELPH: 13.3 %
B-GLOBULIN SERPL ELPH-MCNC: 1 G/DL
BACTERIA BLD CULT: NORMAL
BACTERIA BLD CULT: NORMAL
BACTERIA THROAT CULT: NORMAL
BACTERIA UR CULT: NORMAL
BACTERIA: NEGATIVE
BASOPHILS # BLD AUTO: 0.04 K/UL
BASOPHILS # BLD AUTO: 0.05 K/UL
BASOPHILS # BLD AUTO: 0.07 K/UL
BASOPHILS NFR BLD AUTO: 0.3 %
BASOPHILS NFR BLD AUTO: 0.4 %
BASOPHILS NFR BLD AUTO: 0.5 %
BILIRUB SERPL-MCNC: 0.5 MG/DL
BILIRUB SERPL-MCNC: 0.5 MG/DL
BILIRUB SERPL-MCNC: 0.6 MG/DL
BILIRUBIN URINE: NEGATIVE
BLOOD URINE: ABNORMAL
BUN SERPL-MCNC: 11 MG/DL
BUN SERPL-MCNC: 14 MG/DL
BUN SERPL-MCNC: 15 MG/DL
BUN SERPL-MCNC: 9 MG/DL
CALCIUM SERPL-MCNC: 10 MG/DL
CALCIUM SERPL-MCNC: 10.1 MG/DL
CALCIUM SERPL-MCNC: 10.3 MG/DL
CALCIUM SERPL-MCNC: 9.9 MG/DL
CHLORIDE SERPL-SCNC: 101 MMOL/L
CHLORIDE SERPL-SCNC: 102 MMOL/L
CHLORIDE SERPL-SCNC: 102 MMOL/L
CHLORIDE SERPL-SCNC: 99 MMOL/L
CHOLEST SERPL-MCNC: 154 MG/DL
CO2 SERPL-SCNC: 24 MMOL/L
CO2 SERPL-SCNC: 25 MMOL/L
CO2 SERPL-SCNC: 26 MMOL/L
CO2 SERPL-SCNC: 26 MMOL/L
COLOR: NORMAL
COVID-19 NUCLEOCAPSID  GAM ANTIBODY INTERPRETATION: POSITIVE
COVID-19 SPIKE DOMAIN ANTIBODY INTERPRETATION: POSITIVE
CREAT SERPL-MCNC: 0.97 MG/DL
CREAT SERPL-MCNC: 1.04 MG/DL
CREAT SERPL-MCNC: 1.15 MG/DL
CREAT SERPL-MCNC: 1.2 MG/DL
DEPRECATED D DIMER PPP IA-ACNC: <150 NG/ML DDU
EBV EA AB SER IA-ACNC: <5 U/ML
EBV EA AB TITR SER IF: NEGATIVE
EBV EA IGG SER QL IA: <3 U/ML
EBV EA IGG SER-ACNC: NEGATIVE
EBV EA IGM SER IA-ACNC: NEGATIVE
EBV PATRN SPEC IB-IMP: NORMAL
EBV VCA IGG SER IA-ACNC: 12 U/ML
EBV VCA IGM SER QL IA: <10 U/ML
EOSINOPHIL # BLD AUTO: 0.03 K/UL
EOSINOPHIL NFR BLD AUTO: 0.2 %
EPSTEIN-BARR VIRUS CAPSID ANTIGEN IGG: NEGATIVE
ERYTHROCYTE [SEDIMENTATION RATE] IN BLOOD BY WESTERGREN METHOD: 2 MM/HR
ESTIMATED AVERAGE GLUCOSE: 91 MG/DL
GAMMA GLOB FLD ELPH-MCNC: 1.4 G/DL
GAMMA GLOB MFR SERPL ELPH: 17.5 %
GLUCOSE QUALITATIVE U: NEGATIVE
GLUCOSE SERPL-MCNC: 59 MG/DL
GLUCOSE SERPL-MCNC: 86 MG/DL
GLUCOSE SERPL-MCNC: 86 MG/DL
GLUCOSE SERPL-MCNC: 91 MG/DL
HBA1C MFR BLD HPLC: 4.8 %
HCT VFR BLD CALC: 48.6 %
HCT VFR BLD CALC: 49.2 %
HCT VFR BLD CALC: 50.8 %
HDLC SERPL-MCNC: 50 MG/DL
HETEROPH AB SER QL: NEGATIVE
HGB BLD-MCNC: 15.9 G/DL
HGB BLD-MCNC: 16.7 G/DL
HGB BLD-MCNC: 17 G/DL
HYALINE CASTS: 0 /LPF
IMM GRANULOCYTES NFR BLD AUTO: 0.5 %
IMM GRANULOCYTES NFR BLD AUTO: 0.9 %
IMM GRANULOCYTES NFR BLD AUTO: 1.1 %
INTERPRETATION SERPL IEP-IMP: NORMAL
KETONES URINE: NEGATIVE
LDLC SERPL CALC-MCNC: 94 MG/DL
LEUKOCYTE ESTERASE URINE: NEGATIVE
LPL SERPL-CCNC: 27 U/L
LYMPHOCYTES # BLD AUTO: 2.18 K/UL
LYMPHOCYTES # BLD AUTO: 2.25 K/UL
LYMPHOCYTES # BLD AUTO: 2.66 K/UL
LYMPHOCYTES NFR BLD AUTO: 15.2 %
LYMPHOCYTES NFR BLD AUTO: 15.9 %
LYMPHOCYTES NFR BLD AUTO: 20.5 %
M PROTEIN SPEC IFE-MCNC: NORMAL
MAN DIFF?: NORMAL
MCHC RBC-ENTMCNC: 30 PG
MCHC RBC-ENTMCNC: 30.2 PG
MCHC RBC-ENTMCNC: 30.7 PG
MCHC RBC-ENTMCNC: 32.7 GM/DL
MCHC RBC-ENTMCNC: 33.5 GM/DL
MCHC RBC-ENTMCNC: 33.9 GM/DL
MCV RBC AUTO: 90.2 FL
MCV RBC AUTO: 90.4 FL
MCV RBC AUTO: 91.7 FL
MICROSCOPIC-UA: NORMAL
MONOCYTES # BLD AUTO: 0.71 K/UL
MONOCYTES # BLD AUTO: 0.9 K/UL
MONOCYTES # BLD AUTO: 0.9 K/UL
MONOCYTES NFR BLD AUTO: 5.2 %
MONOCYTES NFR BLD AUTO: 6.1 %
MONOCYTES NFR BLD AUTO: 6.9 %
NEUTROPHILS # BLD AUTO: 10.58 K/UL
NEUTROPHILS # BLD AUTO: 11.44 K/UL
NEUTROPHILS # BLD AUTO: 9.28 K/UL
NEUTROPHILS NFR BLD AUTO: 71.5 %
NEUTROPHILS NFR BLD AUTO: 77.1 %
NEUTROPHILS NFR BLD AUTO: 77.3 %
NITRITE URINE: NEGATIVE
NONHDLC SERPL-MCNC: 103 MG/DL
PH URINE: 5.5
PLATELET # BLD AUTO: 360 K/UL
PLATELET # BLD AUTO: 376 K/UL
PLATELET # BLD AUTO: 380 K/UL
POTASSIUM SERPL-SCNC: 4.2 MMOL/L
POTASSIUM SERPL-SCNC: 4.4 MMOL/L
POTASSIUM SERPL-SCNC: 4.4 MMOL/L
POTASSIUM SERPL-SCNC: 4.9 MMOL/L
PROCALCITONIN SERPL-MCNC: 0.03 NG/ML
PROT SERPL-MCNC: 7.8 G/DL
PROT SERPL-MCNC: 7.8 G/DL
PROT SERPL-MCNC: 8 G/DL
PROT SERPL-MCNC: 8.4 G/DL
PROT SERPL-MCNC: 8.7 G/DL
PROTEIN URINE: NEGATIVE
RAPID RVP RESULT: NOT DETECTED
RAPID RVP RESULT: NOT DETECTED
RBC # BLD: 5.3 M/UL
RBC # BLD: 5.44 M/UL
RBC # BLD: 5.63 M/UL
RBC # FLD: 12.6 %
RBC # FLD: 12.6 %
RBC # FLD: 12.7 %
RED BLOOD CELLS URINE: 1 /HPF
SARS-COV-2 AB SERPL IA-ACNC: >250 U/ML
SARS-COV-2 AB SERPL QL IA: 92.7 INDEX
SARS-COV-2 RNA PNL RESP NAA+PROBE: NOT DETECTED
SARS-COV-2 RNA PNL RESP NAA+PROBE: NOT DETECTED
SODIUM SERPL-SCNC: 138 MMOL/L
SODIUM SERPL-SCNC: 139 MMOL/L
SODIUM SERPL-SCNC: 141 MMOL/L
SODIUM SERPL-SCNC: 142 MMOL/L
SPECIFIC GRAVITY URINE: 1.02
SQUAMOUS EPITHELIAL CELLS: 0 /HPF
T4 FREE SERPL-MCNC: 1.2 NG/DL
TRIGL SERPL-MCNC: 47 MG/DL
TROPONIN-T, HIGH SENSITIVITY: <6 NG/L
TSH SERPL-ACNC: 0.84 UIU/ML
UROBILINOGEN URINE: NORMAL
WBC # FLD AUTO: 12.99 K/UL
WBC # FLD AUTO: 13.72 K/UL
WBC # FLD AUTO: 14.79 K/UL
WHITE BLOOD CELLS URINE: 0 /HPF

## 2022-01-05 ENCOUNTER — NON-APPOINTMENT (OUTPATIENT)
Age: 32
End: 2022-01-05

## 2022-01-09 ENCOUNTER — RX RENEWAL (OUTPATIENT)
Age: 32
End: 2022-01-09

## 2022-03-02 ENCOUNTER — NON-APPOINTMENT (OUTPATIENT)
Age: 32
End: 2022-03-02

## 2022-03-29 ENCOUNTER — APPOINTMENT (OUTPATIENT)
Dept: CARDIOLOGY | Facility: CLINIC | Age: 32
End: 2022-03-29
Payer: COMMERCIAL

## 2022-03-29 ENCOUNTER — NON-APPOINTMENT (OUTPATIENT)
Age: 32
End: 2022-03-29

## 2022-03-29 VITALS
WEIGHT: 270 LBS | HEART RATE: 135 BPM | HEIGHT: 69 IN | DIASTOLIC BLOOD PRESSURE: 76 MMHG | BODY MASS INDEX: 39.99 KG/M2 | SYSTOLIC BLOOD PRESSURE: 140 MMHG | OXYGEN SATURATION: 99 % | TEMPERATURE: 99 F

## 2022-03-29 DIAGNOSIS — Z13.228 ENCOUNTER FOR SCREENING FOR OTHER METABOLIC DISORDERS: ICD-10-CM

## 2022-03-29 PROCEDURE — 99214 OFFICE O/P EST MOD 30 MIN: CPT

## 2022-03-29 PROCEDURE — 93000 ELECTROCARDIOGRAM COMPLETE: CPT

## 2022-03-29 NOTE — HISTORY OF PRESENT ILLNESS
[FreeTextEntry1] : This is a 31 year male with a Pmhx of anxiety who presents to the office for routine follow up\par \par stopped taking paxil mid Feb - pt felt better mentally. \par pt reports on going intermittent chest pain\par pt reports GI issues since beginning of March- ate dairy and had diarrhea. - follows with GI- saw MD, pt reports stomach pain is better\par pt reports feverish feelings with hot sensation behinds the ear\par \par Pt denies any CP, SOB, heart palpitations, dizziness, abdominal pain N/V/D fever or chills\par

## 2022-03-29 NOTE — REVIEW OF SYSTEMS
[Abdominal Pain] : abdominal pain [Diarrhea] : diarrhea [Negative] : Heme/Lymph [FreeTextEntry2] : feeling feverish

## 2022-04-05 LAB
ACTH SER-ACNC: 35.8 PG/ML
ALBUMIN SERPL ELPH-MCNC: 4.8 G/DL
ALP BLD-CCNC: 57 U/L
ALT SERPL-CCNC: 34 U/L
ANION GAP SERPL CALC-SCNC: 13 MMOL/L
APPEARANCE: CLEAR
AST SERPL-CCNC: 23 U/L
BACTERIA: NEGATIVE
BASOPHILS # BLD AUTO: 0.07 K/UL
BASOPHILS NFR BLD AUTO: 0.6 %
BILIRUB DIRECT SERPL-MCNC: 0.1 MG/DL
BILIRUB INDIRECT SERPL-MCNC: 0.2 MG/DL
BILIRUB SERPL-MCNC: 0.3 MG/DL
BILIRUBIN URINE: NEGATIVE
BLOOD URINE: ABNORMAL
BUN SERPL-MCNC: 17 MG/DL
CALCIUM SERPL-MCNC: 10.1 MG/DL
CHLORIDE SERPL-SCNC: 105 MMOL/L
CHOLEST SERPL-MCNC: 168 MG/DL
CK SERPL-CCNC: 241 U/L
CO2 SERPL-SCNC: 26 MMOL/L
COLOR: NORMAL
CREAT SERPL-MCNC: 1.04 MG/DL
EGFR: 99 ML/MIN/1.73M2
EOSINOPHIL # BLD AUTO: 0.09 K/UL
EOSINOPHIL NFR BLD AUTO: 0.8 %
ESTIMATED AVERAGE GLUCOSE: 100 MG/DL
FSH SERPL-MCNC: 8 IU/L
GLUCOSE QUALITATIVE U: NEGATIVE
GLUCOSE SERPL-MCNC: 92 MG/DL
HBA1C MFR BLD HPLC: 5.1 %
HCT VFR BLD CALC: 47.1 %
HDLC SERPL-MCNC: 54 MG/DL
HGB BLD-MCNC: 15.5 G/DL
HYALINE CASTS: 0 /LPF
IMM GRANULOCYTES NFR BLD AUTO: 1.9 %
KETONES URINE: NEGATIVE
LDLC SERPL CALC-MCNC: 102 MG/DL
LEUKOCYTE ESTERASE URINE: NEGATIVE
LH SERPL-ACNC: 13.2 IU/L
LPL SERPL-CCNC: 23 U/L
LYMPHOCYTES # BLD AUTO: 2.45 K/UL
LYMPHOCYTES NFR BLD AUTO: 22.1 %
MAN DIFF?: NORMAL
MCHC RBC-ENTMCNC: 30.3 PG
MCHC RBC-ENTMCNC: 32.9 GM/DL
MCV RBC AUTO: 92 FL
MICROSCOPIC-UA: NORMAL
MONOCYTES # BLD AUTO: 0.82 K/UL
MONOCYTES NFR BLD AUTO: 7.4 %
NEUTROPHILS # BLD AUTO: 7.45 K/UL
NEUTROPHILS NFR BLD AUTO: 67.2 %
NITRITE URINE: NEGATIVE
NONHDLC SERPL-MCNC: 114 MG/DL
PH URINE: 6
PLATELET # BLD AUTO: 305 K/UL
POTASSIUM SERPL-SCNC: 4.7 MMOL/L
PROLACTIN SERPL-MCNC: 12.8 NG/ML
PROT SERPL-MCNC: 7.6 G/DL
PROTEIN URINE: NEGATIVE
RBC # BLD: 5.12 M/UL
RBC # FLD: 12.7 %
RED BLOOD CELLS URINE: 1 /HPF
SODIUM SERPL-SCNC: 144 MMOL/L
SPECIFIC GRAVITY URINE: 1.02
SQUAMOUS EPITHELIAL CELLS: 0 /HPF
T4 FREE SERPL-MCNC: 1.2 NG/DL
TRIGL SERPL-MCNC: 58 MG/DL
TSH SERPL-ACNC: 1.24 UIU/ML
UROBILINOGEN URINE: NORMAL
WBC # FLD AUTO: 11.09 K/UL
WHITE BLOOD CELLS URINE: 0 /HPF

## 2022-04-10 LAB
TESTOST FREE SERPL-MCNC: 15.5 PG/ML
TESTOST SERPL-MCNC: 342 NG/DL

## 2022-05-10 ENCOUNTER — RX RENEWAL (OUTPATIENT)
Age: 32
End: 2022-05-10

## 2022-06-02 ENCOUNTER — NON-APPOINTMENT (OUTPATIENT)
Age: 32
End: 2022-06-02

## 2022-06-03 ENCOUNTER — NON-APPOINTMENT (OUTPATIENT)
Age: 32
End: 2022-06-03

## 2022-06-06 ENCOUNTER — RX RENEWAL (OUTPATIENT)
Age: 32
End: 2022-06-06

## 2022-06-07 ENCOUNTER — APPOINTMENT (OUTPATIENT)
Dept: CARDIOLOGY | Facility: CLINIC | Age: 32
End: 2022-06-07
Payer: COMMERCIAL

## 2022-06-07 ENCOUNTER — NON-APPOINTMENT (OUTPATIENT)
Age: 32
End: 2022-06-07

## 2022-06-07 VITALS
OXYGEN SATURATION: 99 % | WEIGHT: 269 LBS | BODY MASS INDEX: 39.84 KG/M2 | TEMPERATURE: 98.2 F | HEART RATE: 68 BPM | DIASTOLIC BLOOD PRESSURE: 80 MMHG | SYSTOLIC BLOOD PRESSURE: 136 MMHG | HEIGHT: 69 IN

## 2022-06-07 DIAGNOSIS — R06.83 SNORING: ICD-10-CM

## 2022-06-07 DIAGNOSIS — D72.829 ELEVATED WHITE BLOOD CELL COUNT, UNSPECIFIED: ICD-10-CM

## 2022-06-07 PROCEDURE — 93000 ELECTROCARDIOGRAM COMPLETE: CPT

## 2022-06-07 PROCEDURE — 99214 OFFICE O/P EST MOD 30 MIN: CPT

## 2022-06-07 RX ORDER — MELOXICAM 7.5 MG/1
7.5 TABLET ORAL
Qty: 60 | Refills: 0 | Status: DISCONTINUED | COMMUNITY
Start: 2021-10-14 | End: 2022-06-07

## 2022-06-07 RX ORDER — MELOXICAM 7.5 MG/1
7.5 TABLET ORAL
Qty: 40 | Refills: 0 | Status: DISCONTINUED | COMMUNITY
Start: 2022-03-29 | End: 2022-06-07

## 2022-06-07 NOTE — REVIEW OF SYSTEMS
[Chest Discomfort] : chest discomfort [Negative] : Heme/Lymph [FreeTextEntry5] : difficulty taking deep breaths.

## 2022-06-07 NOTE — HISTORY OF PRESENT ILLNESS
[FreeTextEntry1] : This is a 31 year male with a Pmhx of anxiety who presents to the office for evaluation. patient has been previously seen in office for chest pain. S/P extensive negative cardiac work-up. Patient reports that this Sunday he felt his chest pain become worse. Patient states that his pain is mid-sternal and also of his upper chest. patient currently receiving PT and states that certain movements during PT exacerbate his pain. He has tried Aleve and Meloxicam in the past which has not helped with his pain. Patient with no other issues or concerns for today. Also reporting difficulty taking deep breaths and has appt to see Pulmonologist. He will also be tested for sleep apnea.

## 2022-06-15 LAB
CORTICOSTEROID BIND GLOBULIN: 1.8 MG/DL
CORTIS SERPL-MCNC: 9.3 UG/DL
CORTISOL, FREE: 0.79 UG/DL
PFCX: 8.5 %

## 2022-07-05 ENCOUNTER — RX RENEWAL (OUTPATIENT)
Age: 32
End: 2022-07-05

## 2022-07-26 ENCOUNTER — APPOINTMENT (OUTPATIENT)
Dept: INTERNAL MEDICINE | Facility: CLINIC | Age: 32
End: 2022-07-26

## 2022-07-26 ENCOUNTER — NON-APPOINTMENT (OUTPATIENT)
Age: 32
End: 2022-07-26

## 2022-07-26 VITALS
DIASTOLIC BLOOD PRESSURE: 90 MMHG | SYSTOLIC BLOOD PRESSURE: 136 MMHG | HEIGHT: 69 IN | BODY MASS INDEX: 39.84 KG/M2 | HEART RATE: 81 BPM | OXYGEN SATURATION: 99 % | WEIGHT: 269 LBS | TEMPERATURE: 98.7 F

## 2022-07-26 DIAGNOSIS — D17.9 BENIGN LIPOMATOUS NEOPLASM, UNSPECIFIED: ICD-10-CM

## 2022-07-26 PROCEDURE — 93000 ELECTROCARDIOGRAM COMPLETE: CPT

## 2022-07-26 PROCEDURE — 99214 OFFICE O/P EST MOD 30 MIN: CPT | Mod: 25

## 2022-07-26 RX ORDER — PANTOPRAZOLE 40 MG/1
40 TABLET, DELAYED RELEASE ORAL
Qty: 1 | Refills: 0 | Status: DISCONTINUED | COMMUNITY
Start: 2021-11-01 | End: 2022-07-26

## 2022-07-26 RX ORDER — CYCLOBENZAPRINE HYDROCHLORIDE 5 MG/1
5 TABLET, FILM COATED ORAL
Qty: 30 | Refills: 0 | Status: DISCONTINUED | COMMUNITY
Start: 2022-03-28

## 2022-07-31 NOTE — PHYSICAL EXAM
[No Acute Distress] : no acute distress [Well Developed] : well developed [Well-Appearing] : well-appearing [Normal Sclera/Conjunctiva] : normal sclera/conjunctiva [Normal Outer Ear/Nose] : the outer ears and nose were normal in appearance [Normal Oropharynx] : the oropharynx was normal [Supple] : supple [No Respiratory Distress] : no respiratory distress  [No Accessory Muscle Use] : no accessory muscle use [Clear to Auscultation] : lungs were clear to auscultation bilaterally [Regular Rhythm] : with a regular rhythm [Normal S1, S2] : normal S1 and S2 [No Edema] : there was no peripheral edema [No Extremity Clubbing/Cyanosis] : no extremity clubbing/cyanosis [Soft] : abdomen soft [Non Tender] : non-tender [Non-distended] : non-distended [Grossly Normal Strength/Tone] : grossly normal strength/tone [No Focal Deficits] : no focal deficits [Normal Gait] : normal gait [Alert and Oriented x3] : oriented to person, place, and time [de-identified] : +very anxious

## 2022-07-31 NOTE — REVIEW OF SYSTEMS
[Chest Pain] : chest pain [Anxiety] : anxiety [Negative] : Heme/Lymph [Palpitations] : no palpitations [Claudication] : no  leg claudication [Paroxysmal Nocturnal Dyspnea] : no paroxysmal nocturnal dyspnea [Suicidal] : not suicidal [Insomnia] : no insomnia

## 2022-07-31 NOTE — HISTORY OF PRESENT ILLNESS
[Spouse] : spouse [FreeTextEntry8] : DESI MCNAMARA is a 31 year old male who presented to the office today for chest pain\par \par chest pain x  1 year, 2 focal areas, 1 left side, 1 on R, nonexertional, use hemp cream that’s helped, \par popping in chest\par +occasional palpations asssociated with anxiety \par goes to physical therapy \par had GI workup, EGD reportedly negative\par feels something on chest but didn’t see on recent US, MRI or chest CT\par recently had [facility] chest MRI, back MRI  t1-t6 with nerve compression, full report to followup\par follows with spine orthopedics and is seeing pain management for lali for possible spinal injection\par says hes very anxious \par 10/2021: negative CTA chest and negative CTA Coronary\par

## 2022-08-04 ENCOUNTER — NON-APPOINTMENT (OUTPATIENT)
Age: 32
End: 2022-08-04

## 2022-08-04 ENCOUNTER — RX RENEWAL (OUTPATIENT)
Age: 32
End: 2022-08-04

## 2022-08-24 ENCOUNTER — APPOINTMENT (OUTPATIENT)
Dept: CARDIOLOGY | Facility: CLINIC | Age: 32
End: 2022-08-24

## 2022-08-24 VITALS
DIASTOLIC BLOOD PRESSURE: 86 MMHG | SYSTOLIC BLOOD PRESSURE: 160 MMHG | OXYGEN SATURATION: 98 % | HEIGHT: 69 IN | TEMPERATURE: 98.5 F | HEART RATE: 107 BPM | BODY MASS INDEX: 40.43 KG/M2 | WEIGHT: 273 LBS

## 2022-08-24 PROCEDURE — 99214 OFFICE O/P EST MOD 30 MIN: CPT

## 2022-08-24 NOTE — HISTORY OF PRESENT ILLNESS
[FreeTextEntry1] : This is a 31 year male with a Pmhx of anxiety who presents to the office for routine follow up. pt reports going chest pain. currently the last few days pt has been feeling improvement due to PT 2 x weekly. pt  pt with severe episodes of chest and back area .pt with anxiety seeing psychologist and adressing .pt here for f/u \par

## 2022-09-20 ENCOUNTER — APPOINTMENT (OUTPATIENT)
Dept: CARDIOLOGY | Facility: CLINIC | Age: 32
End: 2022-09-20

## 2022-11-10 ENCOUNTER — RX RENEWAL (OUTPATIENT)
Age: 32
End: 2022-11-10

## 2022-12-28 NOTE — ED PROVIDER NOTE - DISCHARGE DATE
Bernadine Aguilar MD     15-Oct-2021

## 2023-02-05 ENCOUNTER — RX RENEWAL (OUTPATIENT)
Age: 33
End: 2023-02-05

## 2023-04-17 ENCOUNTER — RX RENEWAL (OUTPATIENT)
Age: 33
End: 2023-04-17

## 2023-06-07 ENCOUNTER — APPOINTMENT (OUTPATIENT)
Dept: CARDIOLOGY | Facility: CLINIC | Age: 33
End: 2023-06-07

## 2023-07-25 ENCOUNTER — APPOINTMENT (OUTPATIENT)
Dept: INTERNAL MEDICINE | Facility: CLINIC | Age: 33
End: 2023-07-25
Payer: COMMERCIAL

## 2023-07-25 ENCOUNTER — NON-APPOINTMENT (OUTPATIENT)
Age: 33
End: 2023-07-25

## 2023-07-25 VITALS
HEART RATE: 92 BPM | DIASTOLIC BLOOD PRESSURE: 98 MMHG | OXYGEN SATURATION: 98 % | SYSTOLIC BLOOD PRESSURE: 152 MMHG | BODY MASS INDEX: 40.73 KG/M2 | HEIGHT: 69 IN | WEIGHT: 275 LBS

## 2023-07-25 VITALS — DIASTOLIC BLOOD PRESSURE: 88 MMHG | SYSTOLIC BLOOD PRESSURE: 148 MMHG

## 2023-07-25 DIAGNOSIS — R63.5 ABNORMAL WEIGHT GAIN: ICD-10-CM

## 2023-07-25 DIAGNOSIS — R00.2 PALPITATIONS: ICD-10-CM

## 2023-07-25 PROCEDURE — 93000 ELECTROCARDIOGRAM COMPLETE: CPT

## 2023-07-25 PROCEDURE — 99214 OFFICE O/P EST MOD 30 MIN: CPT | Mod: 25

## 2023-07-25 RX ORDER — GABAPENTIN 100 MG/1
100 CAPSULE ORAL
Qty: 90 | Refills: 1 | Status: DISCONTINUED | COMMUNITY
Start: 2022-08-24 | End: 2023-07-25

## 2023-07-26 ENCOUNTER — APPOINTMENT (OUTPATIENT)
Dept: CARDIOLOGY | Facility: CLINIC | Age: 33
End: 2023-07-26
Payer: COMMERCIAL

## 2023-07-26 ENCOUNTER — TRANSCRIPTION ENCOUNTER (OUTPATIENT)
Age: 33
End: 2023-07-26

## 2023-07-26 PROBLEM — R00.2 PALPITATIONS: Status: ACTIVE | Noted: 2023-07-25

## 2023-07-26 PROBLEM — R63.5 WEIGHT GAIN: Status: ACTIVE | Noted: 2022-03-29

## 2023-07-26 PROCEDURE — 93306 TTE W/DOPPLER COMPLETE: CPT

## 2023-07-26 PROCEDURE — 93015 CV STRESS TEST SUPVJ I&R: CPT

## 2023-07-26 RX ORDER — VORTIOXETINE 5 MG/1
5 TABLET, FILM COATED ORAL DAILY
Qty: 30 | Refills: 0 | Status: DISCONTINUED | COMMUNITY
Start: 2023-07-25 | End: 2023-07-26

## 2023-07-27 ENCOUNTER — EMERGENCY (EMERGENCY)
Facility: HOSPITAL | Age: 33
LOS: 1 days | Discharge: HOME | End: 2023-07-27
Attending: EMERGENCY MEDICINE
Payer: COMMERCIAL

## 2023-07-27 ENCOUNTER — NON-APPOINTMENT (OUTPATIENT)
Age: 33
End: 2023-07-27

## 2023-07-27 VITALS
DIASTOLIC BLOOD PRESSURE: 95 MMHG | WEIGHT: 279.99 LBS | HEIGHT: 69 IN | HEART RATE: 80 BPM | OXYGEN SATURATION: 100 % | TEMPERATURE: 99 F | RESPIRATION RATE: 20 BRPM | SYSTOLIC BLOOD PRESSURE: 147 MMHG

## 2023-07-27 VITALS
DIASTOLIC BLOOD PRESSURE: 82 MMHG | TEMPERATURE: 99 F | SYSTOLIC BLOOD PRESSURE: 132 MMHG | RESPIRATION RATE: 18 BRPM | OXYGEN SATURATION: 98 % | HEART RATE: 60 BPM

## 2023-07-27 LAB
ALBUMIN SERPL ELPH-MCNC: 4.5 G/DL — SIGNIFICANT CHANGE UP (ref 3.3–5)
ALBUMIN SERPL ELPH-MCNC: 4.9 G/DL
ALP BLD-CCNC: 77 U/L
ALP SERPL-CCNC: 77 U/L — SIGNIFICANT CHANGE UP (ref 40–120)
ALT FLD-CCNC: 58 U/L — HIGH (ref 10–45)
ALT SERPL-CCNC: 56 U/L
ANION GAP SERPL CALC-SCNC: 13 MMOL/L
ANION GAP SERPL CALC-SCNC: 14 MMOL/L — SIGNIFICANT CHANGE UP (ref 5–17)
AST SERPL-CCNC: 32 U/L — SIGNIFICANT CHANGE UP (ref 10–40)
AST SERPL-CCNC: 34 U/L
BASOPHILS # BLD AUTO: 0.07 K/UL — SIGNIFICANT CHANGE UP (ref 0–0.2)
BASOPHILS NFR BLD AUTO: 0.6 % — SIGNIFICANT CHANGE UP (ref 0–2)
BILIRUB SERPL-MCNC: 0.6 MG/DL
BILIRUB SERPL-MCNC: 0.6 MG/DL — SIGNIFICANT CHANGE UP (ref 0.2–1.2)
BUN SERPL-MCNC: 8 MG/DL
BUN SERPL-MCNC: 8 MG/DL — SIGNIFICANT CHANGE UP (ref 7–23)
CALCIUM SERPL-MCNC: 9.6 MG/DL — SIGNIFICANT CHANGE UP (ref 8.4–10.5)
CALCIUM SERPL-MCNC: 9.7 MG/DL
CHLORIDE SERPL-SCNC: 103 MMOL/L
CHLORIDE SERPL-SCNC: 103 MMOL/L — SIGNIFICANT CHANGE UP (ref 96–108)
CHOLEST SERPL-MCNC: 120 MG/DL — SIGNIFICANT CHANGE UP
CHOLEST SERPL-MCNC: 131 MG/DL
CK SERPL-CCNC: 307 U/L
CO2 SERPL-SCNC: 25 MMOL/L — SIGNIFICANT CHANGE UP (ref 22–31)
CO2 SERPL-SCNC: 26 MMOL/L
CREAT SERPL-MCNC: 0.97 MG/DL — SIGNIFICANT CHANGE UP (ref 0.5–1.3)
CREAT SERPL-MCNC: 1.03 MG/DL
DEPRECATED D DIMER PPP IA-ACNC: <150 NG/ML DDU
EGFR: 100 ML/MIN/1.73M2
EGFR: 107 ML/MIN/1.73M2 — SIGNIFICANT CHANGE UP
EOSINOPHIL # BLD AUTO: 0.03 K/UL — SIGNIFICANT CHANGE UP (ref 0–0.5)
EOSINOPHIL NFR BLD AUTO: 0.3 % — SIGNIFICANT CHANGE UP (ref 0–6)
ESTIMATED AVERAGE GLUCOSE: 105 MG/DL
GLUCOSE SERPL-MCNC: 85 MG/DL
GLUCOSE SERPL-MCNC: 98 MG/DL — SIGNIFICANT CHANGE UP (ref 70–99)
HBA1C MFR BLD HPLC: 5.3 %
HCT VFR BLD CALC: 46 % — SIGNIFICANT CHANGE UP (ref 39–50)
HDLC SERPL-MCNC: 32 MG/DL — LOW
HDLC SERPL-MCNC: 33 MG/DL
HGB BLD-MCNC: 15.4 G/DL — SIGNIFICANT CHANGE UP (ref 13–17)
IMM GRANULOCYTES NFR BLD AUTO: 0.8 % — SIGNIFICANT CHANGE UP (ref 0–0.9)
LDLC SERPL CALC-MCNC: 75 MG/DL
LIPID PNL WITH DIRECT LDL SERPL: 68 MG/DL — SIGNIFICANT CHANGE UP
LYMPHOCYTES # BLD AUTO: 1.85 K/UL — SIGNIFICANT CHANGE UP (ref 1–3.3)
LYMPHOCYTES # BLD AUTO: 16.2 % — SIGNIFICANT CHANGE UP (ref 13–44)
MCHC RBC-ENTMCNC: 29.5 PG — SIGNIFICANT CHANGE UP (ref 27–34)
MCHC RBC-ENTMCNC: 33.5 GM/DL — SIGNIFICANT CHANGE UP (ref 32–36)
MCV RBC AUTO: 88.1 FL — SIGNIFICANT CHANGE UP (ref 80–100)
MONOCYTES # BLD AUTO: 0.72 K/UL — SIGNIFICANT CHANGE UP (ref 0–0.9)
MONOCYTES NFR BLD AUTO: 6.3 % — SIGNIFICANT CHANGE UP (ref 2–14)
NEUTROPHILS # BLD AUTO: 8.69 K/UL — HIGH (ref 1.8–7.4)
NEUTROPHILS NFR BLD AUTO: 75.8 % — SIGNIFICANT CHANGE UP (ref 43–77)
NON HDL CHOLESTEROL: 89 MG/DL — SIGNIFICANT CHANGE UP
NONHDLC SERPL-MCNC: 98 MG/DL
NRBC # BLD: 0 /100 WBCS — SIGNIFICANT CHANGE UP (ref 0–0)
PLATELET # BLD AUTO: 318 K/UL — SIGNIFICANT CHANGE UP (ref 150–400)
POTASSIUM SERPL-MCNC: 3.3 MMOL/L — LOW (ref 3.5–5.3)
POTASSIUM SERPL-SCNC: 3.3 MMOL/L — LOW (ref 3.5–5.3)
POTASSIUM SERPL-SCNC: 3.5 MMOL/L
PROT SERPL-MCNC: 7.8 G/DL — SIGNIFICANT CHANGE UP (ref 6–8.3)
PROT SERPL-MCNC: 8 G/DL
RBC # BLD: 5.22 M/UL — SIGNIFICANT CHANGE UP (ref 4.2–5.8)
RBC # FLD: 12.8 % — SIGNIFICANT CHANGE UP (ref 10.3–14.5)
SODIUM SERPL-SCNC: 142 MMOL/L
SODIUM SERPL-SCNC: 142 MMOL/L — SIGNIFICANT CHANGE UP (ref 135–145)
T4 FREE SERPL-MCNC: 1.2 NG/DL
TRIGL SERPL-MCNC: 113 MG/DL — SIGNIFICANT CHANGE UP
TRIGL SERPL-MCNC: 123 MG/DL
TROPONIN T, HIGH SENSITIVITY RESULT: 8 NG/L — SIGNIFICANT CHANGE UP (ref 0–51)
TROPONIN T, HIGH SENSITIVITY RESULT: 9 NG/L — SIGNIFICANT CHANGE UP (ref 0–51)
TSH SERPL-ACNC: 0.53 UIU/ML
WBC # BLD: 11.45 K/UL — HIGH (ref 3.8–10.5)
WBC # FLD AUTO: 11.45 K/UL — HIGH (ref 3.8–10.5)

## 2023-07-27 PROCEDURE — 99285 EMERGENCY DEPT VISIT HI MDM: CPT | Mod: 25

## 2023-07-27 PROCEDURE — 71045 X-RAY EXAM CHEST 1 VIEW: CPT

## 2023-07-27 PROCEDURE — 80053 COMPREHEN METABOLIC PANEL: CPT

## 2023-07-27 PROCEDURE — 80061 LIPID PANEL: CPT

## 2023-07-27 PROCEDURE — 71045 X-RAY EXAM CHEST 1 VIEW: CPT | Mod: 26

## 2023-07-27 PROCEDURE — 96374 THER/PROPH/DIAG INJ IV PUSH: CPT | Mod: XU

## 2023-07-27 PROCEDURE — 75574 CT ANGIO HRT W/3D IMAGE: CPT | Mod: 26,MA

## 2023-07-27 PROCEDURE — 83036 HEMOGLOBIN GLYCOSYLATED A1C: CPT

## 2023-07-27 PROCEDURE — 84484 ASSAY OF TROPONIN QUANT: CPT

## 2023-07-27 PROCEDURE — 99285 EMERGENCY DEPT VISIT HI MDM: CPT

## 2023-07-27 PROCEDURE — 75574 CT ANGIO HRT W/3D IMAGE: CPT | Mod: MA

## 2023-07-27 PROCEDURE — 93005 ELECTROCARDIOGRAM TRACING: CPT | Mod: XU

## 2023-07-27 PROCEDURE — 85025 COMPLETE CBC W/AUTO DIFF WBC: CPT

## 2023-07-27 RX ORDER — ACETAMINOPHEN 500 MG
1000 TABLET ORAL ONCE
Refills: 0 | Status: COMPLETED | OUTPATIENT
Start: 2023-07-27 | End: 2023-07-27

## 2023-07-27 RX ORDER — ASPIRIN/CALCIUM CARB/MAGNESIUM 324 MG
81 TABLET ORAL DAILY
Refills: 0 | Status: DISCONTINUED | OUTPATIENT
Start: 2023-07-27 | End: 2023-07-30

## 2023-07-27 RX ORDER — METOPROLOL TARTRATE 50 MG
100 TABLET ORAL ONCE
Refills: 0 | Status: COMPLETED | OUTPATIENT
Start: 2023-07-27 | End: 2023-07-27

## 2023-07-27 RX ADMIN — Medication 100 MILLIGRAM(S): at 09:23

## 2023-07-27 RX ADMIN — Medication 1 MILLIGRAM(S): at 09:21

## 2023-07-27 RX ADMIN — Medication 400 MILLIGRAM(S): at 08:46

## 2023-07-27 NOTE — ED ADULT NURSE NOTE - OBJECTIVE STATEMENT
31 y/o M no pertinent PMH presented to ED per cardiologists recommendation s/p failed stress test yesterday, pt states he initially had stress test performed for rib pain x a few weeks in addition to intermittent midsternal chest pain and episodes of shortness of breath. At this time, pt endorsing rib pain, more prominent on right side, states he has been feeling lightheaded this past week. Pt denies any fevers, chills, diaphoresis, N/V at this time. Pt A&Ox4, breathing spontaneously and unlabored, speaking full sentences, moving all extremities, is ambulatory. Pt is on CM in NSR. Appropriate safety measures in place, stretcher locked in lowest position, family at bedside.

## 2023-07-27 NOTE — ED PROVIDER NOTE - CLINICAL SUMMARY MEDICAL DECISION MAKING FREE TEXT BOX
32-year-old male with PMH HTN sent by PCP and cardiologist Dr. Vazquez for coronary CTA after abnormal exercise stress test yesterday.  Patient have been having intermittent chest pain for the past 2 years.  Reports -6 coronary CTA 2 years ago.  Endorses some chest pain and shortness of breath last night and diarrhea.  Will obtain basic labs, Trope, EKG, chest x-ray, analgesia, coronary CTA.  Dispo pending results. 32-year-old male with PMH HTN sent by PCP and cardiologist Dr. Vazquez for coronary CTA after abnormal exercise stress test yesterday.  Patient have been having intermittent chest pain for the past 2 years.  Reports -6 coronary CTA 2 years ago.  Endorses some chest pain and shortness of breath last night and diarrhea.  Will obtain basic labs, Trope, EKG, chest x-ray, analgesia, coronary CTA.  Dispo pending results.    ctca within normal limits, patient stable, cleared by cardiology, will discharge home.

## 2023-07-27 NOTE — ED ADULT NURSE NOTE - NSFALLUNIVINTERV_ED_ALL_ED
Bed/Stretcher in lowest position, wheels locked, appropriate side rails in place/Call bell, personal items and telephone in reach/Instruct patient to call for assistance before getting out of bed/chair/stretcher/Non-slip footwear applied when patient is off stretcher/[place] to call system/Physically safe environment - no spills, clutter or unnecessary equipment/Purposeful proactive rounding/Room/bathroom lighting operational, light cord in reach

## 2023-07-27 NOTE — ED PROVIDER NOTE - PATIENT PORTAL LINK FT
You can access the FollowMyHealth Patient Portal offered by [facility] by registering at the following website: [email or web address]. By joining [facility]’s FollowMyHealth portal, you will also be able to view your health information using other applications (apps) compatible with our system.

## 2023-07-27 NOTE — ED PROVIDER NOTE - OBJECTIVE STATEMENT
32-year-old male with PMH HTN presents to the emergency department sent by Dr. Vazquez for abnormal stress test on July 26, here to get a coronary CTA.  Patient states that he was told he had an abnormal exercise stress test and to come to the emergency department for this imaging.  Patient has had intermittent chest pain for the last 2 years and was told it is costochondritis however had an abnormal stress.  Yesterday the patient also woke up short of breath and felt like he was punched in the center of his chest.  Also complaining of bilateral Rib pain.  Denies any trauma or falls.  Denies any fevers, chills, cough, congestion, rhinorrhea.  Denies abdominal pain, constipation.  Endorses diarrhea for the past few days.  Patient has not been taking anything for his rib pain.

## 2023-07-27 NOTE — ED CDU PROVIDER DISPOSITION NOTE - CLINICAL COURSE
32-year-old male with PMH HTN presents to the emergency department sent by Dr. Vazquez for abnormal stress test on July 26, here to get a coronary CTA.  Patient states that he was told he had an abnormal exercise stress test and to come to the emergency department for this imaging.  Patient has had intermittent chest pain for the last 2 years and was told it is costochondritis however had an abnormal stress.  Yesterday the patient also woke up short of breath and felt like he was punched in the center of his chest.  Also complaining of bilateral Rib pain.  Denies any trauma or falls.  Denies any fevers, chills, cough, congestion, rhinorrhea.  Denies abdominal pain, constipation.  Endorses diarrhea for the past few days.  Patient has not been taking anything for his rib pain.  In ED, VSS. Pt given tylenol for pain, along with ativan and lopressor for CTC preparation.  In CDU,

## 2023-07-27 NOTE — CONSULT NOTE ADULT - SUBJECTIVE AND OBJECTIVE BOX
DATE OF SERVICE: 07-27-23 @ 16:07    CHIEF COMPLAINT:Patient is a 32y old  Male who presents with a chief complaint of abnormal ST.     HISTORY OF PRESENT ILLNESS:  32-year-old male with PMH HTN presents to the emergency department sent by Dr. Vazquez for abnormal stress test on July 26, here to get a coronary CTA.  Patient has had intermittent chest pain for the last 2 years and was told it is costochondritis however had an abnormal stress.  Yesterday the patient also woke up short of breath and felt like he was punched in the center of his chest. Denies any trauma or falls.  Denies any fevers, chills, cough, congestion, rhinorrhea.  Denies abdominal pain, constipation.  Endorses diarrhea for the past few days.  Patient has not been taking anything for his rib pain.        PAST MEDICAL & SURGICAL HISTORY:  COVID-19 Jan 2021      HTN (hypertension)      No significant past surgical history              MEDICATIONS:  aspirin  chewable 81 milliGRAM(s) Oral daily                  FAMILY HISTORY:      Non-contributory    SOCIAL HISTORY:    [ ] Tobacco  [ ] Drugs  [ ] Alcohol    Allergies    No Known Allergies    Intolerances    	    REVIEW OF SYSTEMS:  CONSTITUTIONAL: No fever  EYES: No eye pain, visual disturbances, or discharge  ENMT:  No difficulty hearing, tinnitus  NECK: No pain or stiffness  RESPIRATORY: No cough, wheezing,  CARDIOVASCULAR: + chest pain, palpitations, passing out, dizziness, or leg swelling  GASTROINTESTINAL:  No nausea, vomiting, diarrhea or constipation. No melena.  GENITOURINARY: No dysuria, hematuria  NEUROLOGICAL: No stroke like symptoms  SKIN: No burning or lesions   ENDOCRINE: No heat or cold intolerance  MUSCULOSKELETAL: No joint pain or swelling  PSYCHIATRIC: No  anxiety, mood swings  HEME/LYMPH: No bleeding gums  ALLERGY AND IMMUNOLOGIC: No hives or eczema	    All other ROS negative    PHYSICAL EXAM:  T(C): 37 (07-27-23 @ 16:00), Max: 37.2 (07-27-23 @ 07:05)  HR: 60 (07-27-23 @ 16:00) (60 - 80)  BP: 132/82 (07-27-23 @ 16:00) (130/78 - 147/95)  RR: 18 (07-27-23 @ 16:00) (18 - 20)  SpO2: 98% (07-27-23 @ 16:00) (97% - 100%)  Wt(kg): --  I&O's Summary      Appearance: Normal	  HEENT:   Normal oral mucosa, EOMI	  Cardiovascular:  S1 S2, No JVD,    Respiratory: Lungs clear to auscultation	  Psychiatry: Alert  Gastrointestinal:  Soft, Non-tender, + BS	  Skin: No rashes   Neurologic: Non-focal  Extremities:  No edema  Vascular: Peripheral pulses palpable    	    	  	  CARDIAC MARKERS:  Labs personally reviewed by me                                  15.4   11.45 )-----------( 318      ( 27 Jul 2023 09:19 )             46.0     07-27    142  |  103  |  8   ----------------------------<  98  3.3<L>   |  25  |  0.97    Ca    9.6      27 Jul 2023 09:19    TPro  7.8  /  Alb  4.5  /  TBili  0.6  /  DBili  x   /  AST  32  /  ALT  58<H>  /  AlkPhos  77  07-27          EKG: Personally reviewed by me - Sinus Arrhythmia  Radiology: Personally reviewed by me -     < from: Xray Chest 1 View- PORTABLE-Urgent (07.27.23 @ 08:39) >  IMPRESSION:    No focal consolidation.    < end of copied text >  < from: CT Angio Heart and Coronaries w/ IV Cont (07.27.23 @ 14:07) >  Coronary CTA: Right coronary artery dominance. No anomalous coronary   artery origin. No significant moderate to severe category coronary artery   stenoses. Scattered minimal stenosis secondary to noncalcified plaque.    Stenosis Reference Ranges:  Minimal <25%  Mild 25-49%  Moderate 50-69%  Severe 70-99%  Occluded >99%    < end of copied text >  < from: CT Angio Heart and Coronaries w/ IV Cont (10.19.21 @ 11:54) >  Total coronary artery calcium score: 0.    Limited exam due to suboptimal contrast bolus timing. The distal aspects of the coronary arteries cannot be well evaluated. No obvious obstructive coronary artery disease identified.      Assessment /Plan:      32-year-old male with PMH HTN presents to the emergency department sent by Dr. Vazquez for abnormal stress test on July 26, here to get a coronary CTA.  Patient has had intermittent chest pain for the last 2 years and was told it is costochondritis however had an abnormal stress.  Yesterday the patient also woke up short of breath and felt like he was punched in the center of his chest. Denies any trauma or falls.  Denies any fevers, chills, cough, congestion, rhinorrhea.  Denies abdominal pain, constipation.  Endorses diarrhea for the past few days.  Patient has not been taking anything for his rib pain.    Problem/Plan - 1:  ·  Problem: Chest pain.   ·  Plan: CT Cors with mild, non-obstructive CAD  - CXR neg  - Trop neg x2   - No further inpatient workup. OP follow up with Dr. Vazquez.     Problem/Plan - 2:  ·  Problem: HTN  - BP mostly at target but mostly wnl  - OP follow up to assess need for anti-hypertensives    Differential diagnosis and plan of care discussed with patient after the evaluation. Counseling on diet, nutritional counseling, weight management, exercise and medication compliance was done.   Advanced care planning/advanced directives discussed with patient/family. DNR status including forceful chest compressions to attempt to restart the heart, ventilator support/artificial breathing, electric shock, artificial nutrition, health care proxy, Molst form all discussed with pt. Pt wishes to consider. More than fifteen minutes spent on discussing advanced directives.       Lucas Pang DO [facility]  Cardiovascular Medicine  [address], [address]  Available for call or text via [employer]  Office [phone]   DATE OF SERVICE: 07-27-23 @ 16:07    CHIEF COMPLAINT:Patient is a 32y old  Male who presents with a chief complaint of abnormal ST.     HISTORY OF PRESENT ILLNESS:  32-year-old male with PMH HTN presents to the emergency department sent by Dr. Vazquez for abnormal stress test on July 26, here to get a coronary CTA.  Patient has had intermittent chest pain for the last 2 years and was told it is costochondritis however had an abnormal stress.  Yesterday the patient also woke up short of breath and felt like he was punched in the center of his chest. Denies any trauma or falls.  Denies any fevers, chills, cough, congestion, rhinorrhea.  Denies abdominal pain, constipation.  Endorses diarrhea for the past few days.  Patient has not been taking anything for his rib pain.        PAST MEDICAL & SURGICAL HISTORY:  COVID-19  Jan 2021      HTN (hypertension)      No significant past surgical history              MEDICATIONS:  aspirin  chewable 81 milliGRAM(s) Oral daily                  FAMILY HISTORY:      Non-contributory    SOCIAL HISTORY:    [ ] not a smoke    Allergies    No Known Allergies    Intolerances    	    REVIEW OF SYSTEMS:  CONSTITUTIONAL: No fever  EYES: No eye pain, visual disturbances, or discharge  ENMT:  No difficulty hearing, tinnitus  NECK: No pain or stiffness  RESPIRATORY: No cough, wheezing,  CARDIOVASCULAR: + chest pain, palpitations, passing out, dizziness, or leg swelling  GASTROINTESTINAL:  No nausea, vomiting, diarrhea or constipation. No melena.  GENITOURINARY: No dysuria, hematuria  NEUROLOGICAL: No stroke like symptoms  SKIN: No burning or lesions   ENDOCRINE: No heat or cold intolerance  MUSCULOSKELETAL: No joint pain or swelling  PSYCHIATRIC: No  anxiety, mood swings  HEME/LYMPH: No bleeding gums  ALLERGY AND IMMUNOLOGIC: No hives or eczema	    All other ROS negative    PHYSICAL EXAM:  T(C): 37 (07-27-23 @ 16:00), Max: 37.2 (07-27-23 @ 07:05)  HR: 60 (07-27-23 @ 16:00) (60 - 80)  BP: 132/82 (07-27-23 @ 16:00) (130/78 - 147/95)  RR: 18 (07-27-23 @ 16:00) (18 - 20)  SpO2: 98% (07-27-23 @ 16:00) (97% - 100%)  Wt(kg): --  I&O's Summary      Appearance: Normal	  HEENT:   Normal oral mucosa, EOMI	  Cardiovascular:  S1 S2, No JVD,    Respiratory: Lungs clear to auscultation	  Psychiatry: Alert  Gastrointestinal:  Soft, Non-tender, + BS	  Skin: No rashes   Neurologic: Non-focal  Extremities:  No edema  Vascular: Peripheral pulses palpable    	    	  	  CARDIAC MARKERS:  Labs personally reviewed by me                                  15.4   11.45 )-----------( 318      ( 27 Jul 2023 09:19 )             46.0     07-27    142  |  103  |  8   ----------------------------<  98  3.3<L>   |  25  |  0.97    Ca    9.6      27 Jul 2023 09:19    TPro  7.8  /  Alb  4.5  /  TBili  0.6  /  DBili  x   /  AST  32  /  ALT  58<H>  /  AlkPhos  77  07-27          EKG: Personally reviewed by me - Sinus Arrhythmia  Radiology: Personally reviewed by me -     < from: Xray Chest 1 View- PORTABLE-Urgent (07.27.23 @ 08:39) >  IMPRESSION:    No focal consolidation.    < end of copied text >  < from: CT Angio Heart and Coronaries w/ IV Cont (07.27.23 @ 14:07) >  Coronary CTA: Right coronary artery dominance. No anomalous coronary   artery origin. No significant moderate to severe category coronary artery   stenoses. Scattered minimal stenosis secondary to noncalcified plaque.    Stenosis Reference Ranges:  Minimal <25%  Mild 25-49%  Moderate 50-69%  Severe 70-99%  Occluded >99%    < end of copied text >  < from: CT Angio Heart and Coronaries w/ IV Cont (10.19.21 @ 11:54) >  Total coronary artery calcium score: 0.    Limited exam due to suboptimal contrast bolus timing. The distal aspects of the coronary arteries cannot be well evaluated. No obvious obstructive coronary artery disease identified.      Assessment /Plan:      32-year-old male with PMH HTN presents to the emergency department sent by Dr. Vazquez for abnormal stress test on July 26, here to get a coronary CTA.  Patient has had intermittent chest pain for the last 2 years and was told it is costochondritis however had an abnormal stress.  Yesterday the patient also woke up short of breath and felt like he was punched in the center of his chest. Denies any trauma or falls.  Denies any fevers, chills, cough, congestion, rhinorrhea.  Denies abdominal pain, constipation.  Endorses diarrhea for the past few days.  Patient has not been taking anything for his rib pain.    Problem/Plan - 1:  ·  Problem: Chest pain.   ·  Plan: CT Cors with mild, non-obstructive CAD  - CXR neg  - Trop neg x2   - No further inpatient workup. OP follow up with Dr. Vazquez.     Problem/Plan - 2:  ·  Problem: HTN  - BP mostly at target but mostly wnl  - OP follow up to assess need for anti-hypertensives            Differential diagnosis and plan of care discussed with patient after the evaluation. Counseling on diet, nutritional counseling, weight management, exercise and medication compliance was done.   Advanced care planning/advanced directives discussed with patient/family. DNR status including forceful chest compressions to attempt to restart the heart, ventilator support/artificial breathing, electric shock, artificial nutrition, health care proxy, Molst form all discussed with pt. Pt wishes to consider. More than fifteen minutes spent on discussing advanced directives.       Lucas Pang DO [facility]  Cardiovascular Medicine  [address], [address]  Available for call or text via [employer]  Office [phone]

## 2023-07-27 NOTE — ED CDU PROVIDER INITIAL DAY NOTE - NSICDXPASTSURGICALHX_GEN_ALL_CORE_FT
[At Term] : at term [Normal Vaginal Route] : by normal vaginal route [None] : there were no delivery complications [Age Appropriate] : age appropriate developmental milestones met PAST SURGICAL HISTORY:  No significant past surgical history

## 2023-07-27 NOTE — ED PROVIDER NOTE - ATTENDING CONTRIBUTION TO CARE
Jesse Pedraza MD, FACEP: In this physician's medical judgement based on clinical history and physical exam the patient's signs and symptoms lead to differential diagnoses which includes but is not limited to: intercostal muscle pain, chest wall pain, cad    Historical features, symptoms, and clinical exam not consistent with: aortic dissection, pe/vte    Labs were ordered and independently reviewed by me.  EKG was ordered and independently reviewed by me.  Imaging was ordered and reviewed by me.      Appropriate medications for the patient's presenting complaints were ordered, and effects were reassessed.     Patient's records including prior hospital visit, med and medical history were reviewed.   History assisted by father  Escalation to admission/observation was considered. However, patient better served with outpatient primary medical doctor and/or specialist and given strict return precautions and expectant management.     Will follow up on labs, therapeutics, imaging, reassess and disposition as clinically indicated.  *The above represents an initial assessment/impression. Please refer to my progress notes below for potential changes in patient clinical course* Jesse Pedraza MD, FACEP: In this physician's medical judgement based on clinical history and physical exam the patient's signs and symptoms lead to differential diagnoses which includes but is not limited to: intercostal muscle pain, chest wall pain, cad    Historical features, symptoms, and clinical exam not consistent with: aortic dissection, pe/vte    Labs were ordered and independently reviewed by me.  EKG was ordered and independently reviewed by me.  Imaging was ordered and reviewed by me.      Appropriate medications for the patient's presenting complaints were ordered, and effects were reassessed.     Patient's records including prior hospital visit, med and medical history were reviewed.   History assisted by father  Escalation to admission/observation was considered. However, patient better served with outpatient primary medical doctor and/or specialist and given strict return precautions and expectant management.     Will follow up on labs, therapeutics, imaging, reassess and disposition as clinically indicated.  *The above represents an initial assessment/impression. Please refer to my progress notes below for potential changes in patient clinical course*    The patient was serially evaluated throughout emergency department course by the team. There was no acute deterioration up to this time in the emergency department. The patient has demonstrated clinical improvement and/or stability, feels better at this time according to emergency department team. Agree with goals/plan of emergency department care as described in this physician's electronic medical record, including diagnostics, therapeutics and consultation recommendation as clinically warranted. Will discharge home with close outpatient follow up with primary care physician/provider and specialist if necessary. The patient and/or family was educated on expectant management and return precautions concerning signs and features to return to the emergency department, in layman terms, including but not limited to: nausea, vomiting, fever, chills, the inability to eat, take medications, or drink, persistent/worsening symptoms or any concerns at all. There are no acute or immediate life threatening issues present on history, clinical exam, or any diagnostic evaluation. The patient is a safe disposition home, has capacity and insight into their condition, is ambulatory in the Emergency Department with no further questions and will follow up with their doctor(s) this week. Diagnosis, prognosis, natural history and treatment was discussed with patient and/or family. The patient and/or family were given the opportunity to ask questions and have them answered in full. The patient and/or family are with capacity and insight into the situation, treatment, risks, benefits, alternative therapies, and understand that they can ask any further questions if needed. Patient and/or family/guardian understand anticipatory guidance were given strict return and follow up precautions.  The patient and/or family/guardian have been informed of all concerning signs and symptoms to return to Emergency Department, the necessity to follow up with the PMD/Clinic/follow up provided within 2-3 days was explained, and the patient and/or family reports understanding of above with capacity and insight. The patient and/or family/guardian were informed of any results of their tests and are were encouraged to follow up on the findings with their doctor as well as the need to inform their doctor of any results. The patient and/or family/guardian are aware of the need to follow up with repeat testing as applicable and report understanding of the above with capacity and insight. The patient and/or family/guardian was made aware of any pending test results at the time of discharge and of the need to call back for the final results as well as the need to inform their doctor of the results.

## 2023-07-27 NOTE — ED ADULT TRIAGE NOTE - HEART RATE (BEATS/MIN)
met with patient at bedside. Patient's son Damon present this time and provided info.  Patient  independent with ADLS. No DME or home health.  Patient's physical address: [address] [place] [address]  Patient's son Damon Srivastava requested Case Management call him before setting up anything for his mother. Cell #[phone].     09/28/18 1200   Discharge Assessment   Assessment Type Discharge Planning Assessment   Confirmed/corrected address and phone number on facesheet? Yes   Assessment information obtained from? Caregiver   Prior to hospitilization cognitive status: Alert/Oriented   Prior to hospitalization functional status: Independent   Current cognitive status: Alert/Oriented   Current Functional Status: Independent   Lives With alone   Able to Return to Prior Arrangements yes   Is patient able to care for self after discharge? Yes   Who are your caregiver(s) and their phone number(s)? Damon Srivastava [phone]   Patient's perception of discharge disposition home or selfcare   Readmission Within The Last 30 Days no previous admission in last 30 days   Patient currently being followed by outpatient case management? No   Patient currently receives any other outside agency services? No   Equipment Currently Used at Home none   Do you have any problems affording any of your prescribed medications? No   Is the patient taking medications as prescribed? yes   Does the patient have transportation home? Yes   Transportation Available family or friend will provide   Does the patient receive services at the [facility]mad[facility] Clinic? No   Discharge Plan A Home   Discharge Plan B Other   Patient/Family In Agreement With Plan yes      80

## 2023-07-27 NOTE — ED CDU PROVIDER INITIAL DAY NOTE - OBJECTIVE STATEMENT
32-year-old male with PMH HTN presents to the emergency department sent by Dr. Vazquez for abnormal stress test on July 26, here to get a coronary CTA.  Patient states that he was told he had an abnormal exercise stress test and to come to the emergency department for this imaging.  Patient has had intermittent chest pain for the last 2 years and was told it is costochondritis however had an abnormal stress.  Yesterday the patient also woke up short of breath and felt like he was punched in the center of his chest.  Also complaining of bilateral Rib pain.  Denies any trauma or falls.  Denies any fevers, chills, cough, congestion, rhinorrhea.  Denies abdominal pain, constipation.  Endorses diarrhea for the past few days.  Patient has not been taking anything for his rib pain.  In ED, VSS. Pt given tylenol for pain, along with ativan and lopressor for CTC preparation.

## 2023-07-27 NOTE — ED CDU PROVIDER DISPOSITION NOTE - NSFOLLOWUPINSTRUCTIONS_ED_ALL_ED_FT
Rest. Stay well hydrated.   Take all of your other medications as previously prescribed.     Please follow up with your Primary Care Provider and Cardiologist Dr. Vazquez in office this week for further evaluation and management.  Show copies of your reports given to you.      Return to ER for any worsening or continued chest pain, shortness of breath, weakness, passing out, or any other concerning symptoms.

## 2023-07-27 NOTE — ED PROCEDURE NOTE - PROCEDURE ADDITIONAL DETAILS
Emergency Department Focused Ultrasound performed at patient's bedside.  The complete report can be found in PACS. Emergency Department Focused Ultrasound performed at patient's bedside.  The complete report can be found in PACS. followup study is CT coronary Emergency Department Focused Ultrasound performed at patient's bedside for educational purposes. The study will have a follow up study performed or was performed in the direct supervision of an ultrasound trained attending. . followup study is CT coronary

## 2023-07-27 NOTE — ED PROCEDURE NOTE - NS ED ATTENDING STATEMENT MOD
This was a shared visit with the CALIXTO. I reviewed and verified the documentation and independently performed the documented:

## 2023-07-27 NOTE — ED PROVIDER NOTE - NSFOLLOWUPINSTRUCTIONS_ED_ALL_ED_FT
You were evaluated in the emergency department for an abnormal stress test.  Your results were discussed with you and are attached.  Please follow-up with your primary care and cardiologist with plan Dr. Vazquez in the office within 1 week for further evaluation and management.  She will copies of the reports given to you.    You may take up to 1000 mg Tylenol (acetaminophen) every six hours as needed for pain.    Return to the emergency department for any worsening or continued chest pain, shortness of breath, weakness, passing out, or any other concerning symptoms.    You were seen and evaluated in the emergency department for chest pain. Your exam, laboratory findings, EKG, imaging and other assessments were reassuring. We did not find evidence for a dangerous cause of your pain. This does not mean your pain is not real or concerning, only that we could not find a dangerous or life-threatening cause.    It is important to understand that while your workup was reassuring, no medical workup can completely exclude all concerning conditions. Therefore, we ask that you please return if you develop new or worsening pain, trouble breathing, fainting (or feel that you might faint), weakness, inability to perform your daily activities, or other concerning new symptoms. Take your medication as prescribed.    Please be sure to follow up with your regular doctor in 2-3 days.    Rest, drink plenty of fluids.  Advance activity as tolerated.  Continue all previously prescribed medications as directed.  Follow up with your primary care physician in 48-72 hours- bring copies of your results.  Return to the ER for worsening or persistent symptoms, and/or ANY NEW OR CONCERNING SYMPTOMS. If you have issues obtaining follow up, please call: [phone] to obtain a doctor or specialist who takes your insurance in your area.

## 2023-07-27 NOTE — ED CDU PROVIDER INITIAL DAY NOTE - DETAILS
CHEST PAIN  -Continuous Telemetry Monitoring  -Serial Any   -CT Coronary  -Deb Re-eval  Case d/w Attending Milo

## 2023-07-27 NOTE — ED CDU PROVIDER INITIAL DAY NOTE - PHYSICAL EXAMINATION
GENERAL: well appearing obese male in no acute distress, non-toxic appearing  HEAD: normocephalic, atraumatic  HEENT: normal conjunctiva  CARDIAC: regular rate and rhythm, normal S1S2, no appreciable murmurs  PULM: normal breath sounds, clear to ascultation bilaterally  GI: abdomen nondistended, soft, nontender, no guarding, rebound tenderness  : no suprapubic tenderness  NEURO: normal speech, PER, EOMI, normal gait, AAOx3  MSK: no peripheral edema  SKIN: well-perfused, extremities warm, no visible rashes  PSYCH: +anxious-appearing

## 2023-07-27 NOTE — ED PROCEDURE NOTE - ATTENDING APP SHARED VISIT CONTRIBUTION OF CARE
***Jesse Pedraza MD FACEP*** Attending physician was available for the key components of the procedure, the patient tolerated well. There were no complications with the procedure.

## 2023-07-27 NOTE — ED ADULT NURSE NOTE - CAS DISCH CONDITION
Department of Anesthesiology  Preprocedure Note       Name:  Kb Morgan   Age:  72 y.o.  :  1950                                          MRN:  [id number]         Date:  10/4/2021      Surgeon: Jabier Juarez):  Krista Carreon Nelly Trimble,  mL/hr at 10/04/21 0924 1,500 mg at 10/04/21 4574       APOLLO WOODARD  CPT CODE - 43761, 56898 performed by Sara Parkinson MD at [address] Right 13    DR Eve Watson       PRADO:, hyperlipidemia    (-) hypertension, past MI, Ace Rodriguez MD   10/4/2021 Stable

## 2023-07-27 NOTE — ED PROVIDER NOTE - NSICDXPASTMEDICALHX_GEN_ALL_CORE_FT
[FreeTextEntry1] : 33 y/o M pt presents for evaluation of deviated nasal septum and nasal congestion. On exam pt presents with severe global R DNS, polyps in sphenoidcoanal on R, poor tip support, deflected to R. Patient very motivated to address breathing and is considering surgery intervention at this time. \par - Allergy test performed and reviewed today 6/12/23. Counseled patient at length on pathophysiology all allergies and techniques for avoidance. handouts given.significant allergies reviewed \par \par Patient following up with bleeding s/p septo turbs nasal valve repair with graft on 6/28/23, 1 week s/p cauterization on right with nasopore placement. \par the patient was debrided bilaterally with rigid suction as a result of nasal crusting. breathing much improved after detriment. Patient should continue to avoid nose blowing, heavy lifting or exercising, and should start a regimen of over the counter nasal saline spray, saline gel and bacitracin for moisturization of the nasal cavities.\par will follow up to assure no scarring and keep sinuses open\par patient is very happy with breathing. \par 
PAST MEDICAL HISTORY:  COVID-19 Jan 2021    HTN (hypertension)

## 2023-07-27 NOTE — ED PROVIDER NOTE - PROGRESS NOTE DETAILS
Duran Hodges MD PGY-2: Spoke with patient's Dr. Panda Galvez who works with Dr. Vazquez who stated that he was sent for a coronary CTA for abnormal stress test and if it is negative he can go home.  Also spoke with Dr. Vazquez's nurse practitioner who stated the same and will come see the patient. Duran Hodges MD PGY-2: Call [phone] and spoke with coronary CTA tachycardia stated that the test likely will not be done until this afternoon after 1 PM.  Needs heart rate to be lowered can give metoprolol 100, needs GFR above 30, Trop, 18gauge IV Duran Hodges MD PGY-2: Discussed with patient offer to give ativan for goal of heart rate reduction along with metoprolol. Pt agrees and states he has taken ativan before. Duran Hodges MD PGY-2: Spoke with patient's Dr. Panda Galvez who works with Dr. Vazquez who stated that he was sent for a coronary CTA for abnormal stress test and if it is negative he can go home. Reported normal creatinine and negative dimer in the office yesterday. Also spoke with Dr. Vazquez's nurse practitioner who stated the same and will come see the patient.

## 2023-07-27 NOTE — ED PROVIDER NOTE - PHYSICAL EXAMINATION
GENERAL: well appearing obese male in no acute distress, non-toxic appearing  HEAD: normocephalic, atraumatic  HEENT: normal conjunctiva  CARDIAC: regular rate and rhythm, normal S1S2, no appreciable murmurs, 2+ pulses in UE b/l  PULM: normal breath sounds, clear to ascultation bilaterally, no rales, rhonchi, wheezing  GI: abdomen nondistended, soft, nontender, no guarding, rebound tenderness  : no suprapubic tenderness  NEURO: normal speech, PER, EOMI, normal gait, AAOx3  MSK: no peripheral edema  SKIN: well-perfused, extremities warm, no visible rashes  PSYCH: anxious mood and affect

## 2023-07-28 LAB
A1C WITH ESTIMATED AVERAGE GLUCOSE RESULT: 5.1 % — SIGNIFICANT CHANGE UP (ref 4–5.6)
ESTIMATED AVERAGE GLUCOSE: 100 MG/DL — SIGNIFICANT CHANGE UP (ref 68–114)

## 2023-07-31 PROBLEM — I10 ESSENTIAL (PRIMARY) HYPERTENSION: Chronic | Status: ACTIVE | Noted: 2023-07-27

## 2023-08-01 ENCOUNTER — APPOINTMENT (OUTPATIENT)
Dept: INTERNAL MEDICINE | Facility: CLINIC | Age: 33
End: 2023-08-01
Payer: COMMERCIAL

## 2023-08-01 ENCOUNTER — APPOINTMENT (OUTPATIENT)
Dept: OTOLARYNGOLOGY | Facility: CLINIC | Age: 33
End: 2023-08-01
Payer: COMMERCIAL

## 2023-08-01 VITALS
TEMPERATURE: 97.3 F | WEIGHT: 272 LBS | HEART RATE: 79 BPM | BODY MASS INDEX: 40.29 KG/M2 | HEIGHT: 69 IN | SYSTOLIC BLOOD PRESSURE: 159 MMHG | DIASTOLIC BLOOD PRESSURE: 93 MMHG

## 2023-08-01 VITALS
WEIGHT: 272 LBS | HEART RATE: 89 BPM | SYSTOLIC BLOOD PRESSURE: 150 MMHG | OXYGEN SATURATION: 99 % | HEIGHT: 69 IN | BODY MASS INDEX: 40.29 KG/M2 | DIASTOLIC BLOOD PRESSURE: 90 MMHG | RESPIRATION RATE: 14 BRPM

## 2023-08-01 VITALS — DIASTOLIC BLOOD PRESSURE: 80 MMHG | SYSTOLIC BLOOD PRESSURE: 150 MMHG

## 2023-08-01 DIAGNOSIS — J34.2 DEVIATED NASAL SEPTUM: ICD-10-CM

## 2023-08-01 DIAGNOSIS — R04.0 EPISTAXIS: ICD-10-CM

## 2023-08-01 PROCEDURE — 93000 ELECTROCARDIOGRAM COMPLETE: CPT

## 2023-08-01 PROCEDURE — 99214 OFFICE O/P EST MOD 30 MIN: CPT | Mod: 25

## 2023-08-01 PROCEDURE — 99204 OFFICE O/P NEW MOD 45 MIN: CPT | Mod: 25

## 2023-08-01 PROCEDURE — 31231 NASAL ENDOSCOPY DX: CPT

## 2023-08-01 NOTE — END OF VISIT
[FreeTextEntry3] : I, Dr. Watson personally performed the evaluation and management (E/M) services including all necessary procedures, for this new patient. That E/M includes conducting the clinically appropriate initial history &/or exam, assessing all conditions, and establishing the plan of care. Today, my CALIXTO, Magdalena Resendiz, was here to observe &/or participate in the visit & follow plan of care established by me.

## 2023-08-01 NOTE — HISTORY OF PRESENT ILLNESS
[de-identified] : Patient comes in with recent nosebleeds from the right nostril, occasionally from the left as well. He is not on any blood thinners. He feels mild pressure in the septum as well if he touches. He does not have any history of being cauterized. He does not have any issues with nasal congestion or runny nose. His last nosebleed was yesterday Right now he is using Flonase.  He has had increased anxiety recently, and his BP has been high as well

## 2023-08-01 NOTE — ASSESSMENT
[FreeTextEntry1] : Followup with your primary care doctor for hypertension.  Patient suffers from anxiety has had some several small little nosebleeds predominantly from his right nasal cavity endoscopically has a deviated septum 2 little spots that I like to cauterize but is incredibly anxious and rather wait.  We gave him instructions of what to do in the future if nosebleeds were to occur.  The rest of his examinations essentially normal I put him on nasal gel his blood pressure was high today we will he started blood pressure medications obviously if his nosebleeds were to continue he will follow-up with us and we will cauterize him I also told him to stop all nasal sprays including Flonase that was what he was recently prescribed.

## 2023-08-01 NOTE — CONSULT LETTER
[Dear  ___] : Dear  [unfilled], [Consult Letter:] : I had the pleasure of evaluating your patient, [unfilled]. [Please see my note below.] : Please see my note below. [Consult Closing:] : Thank you very much for allowing me to participate in the care of this patient.  If you have any questions, please do not hesitate to contact me. [Sincerely,] : Sincerely, [FreeTextEntry3] : Ranjan Watson MD [facility] Associated Professor, Gayathri

## 2023-08-06 NOTE — REVIEW OF SYSTEMS
[Chest Pain] : chest pain [Suicidal] : not suicidal [Insomnia] : no insomnia [Anxiety] : anxiety [Depression] : no depression [Negative] : Heme/Lymph

## 2023-08-06 NOTE — PLAN
[FreeTextEntry1] : Gastroenterology\par colonoscopy - scheduled for 7/28/23\par \par Musculoskeletal\par Side rib pain - stop pressing on ribs, continue with PT if needed\par \par Cardiology\par Rx given for Echocardiogram and NST on 7/26/23\par \par Medications\par discontinue propranolol 10mg\par \par

## 2023-08-06 NOTE — HEALTH RISK ASSESSMENT
[0] : 2) Feeling down, depressed, or hopeless: Not at all (0) [PHQ-2 Negative - No further assessment needed] : PHQ-2 Negative - No further assessment needed [Never] : Never [[id number]] : 0

## 2023-08-06 NOTE — REVIEW OF SYSTEMS
[Nosebleeds] : nosebleeds [Headache] : headache [Negative] : Heme/Lymph [Anxiety] : anxiety [Suicidal] : not suicidal [Insomnia] : no insomnia [FreeTextEntry4] : sinus pressure

## 2023-08-06 NOTE — PHYSICAL EXAM
[No Acute Distress] : no acute distress [Well Nourished] : well nourished [Well Developed] : well developed [Well-Appearing] : well-appearing [Normal Sclera/Conjunctiva] : normal sclera/conjunctiva [Normal Outer Ear/Nose] : the outer ears and nose were normal in appearance [Normal Oropharynx] : the oropharynx was normal [No JVD] : no jugular venous distention [No Lymphadenopathy] : no lymphadenopathy [Supple] : supple [No Respiratory Distress] : no respiratory distress  [No Accessory Muscle Use] : no accessory muscle use [Clear to Auscultation] : lungs were clear to auscultation bilaterally [Normal Rate] : normal rate  [Regular Rhythm] : with a regular rhythm [Normal S1, S2] : normal S1 and S2 [No Murmur] : no murmur heard [No Carotid Bruits] : no carotid bruits [No Varicosities] : no varicosities [Pedal Pulses Present] : the pedal pulses are present [No Edema] : there was no peripheral edema [No Extremity Clubbing/Cyanosis] : no extremity clubbing/cyanosis [Soft] : abdomen soft [Non Tender] : non-tender [Non-distended] : non-distended [Normal Bowel Sounds] : normal bowel sounds [Normal Anterior Cervical Nodes] : no anterior cervical lymphadenopathy [No CVA Tenderness] : no CVA  tenderness [No Spinal Tenderness] : no spinal tenderness [No Joint Swelling] : no joint swelling [Grossly Normal Strength/Tone] : grossly normal strength/tone [No Rash] : no rash [Coordination Grossly Intact] : coordination grossly intact [No Focal Deficits] : no focal deficits [Normal Gait] : normal gait [Alert and Oriented x3] : oriented to person, place, and time [Normal TMs] : both tympanic membranes were normal [Normal Nasal Mucosa] : the nasal mucosa was normal [de-identified] : no bleeding [de-identified] : +anxious

## 2023-08-06 NOTE — PHYSICAL EXAM
[Well Nourished] : well nourished [No Acute Distress] : no acute distress [Well Developed] : well developed [Well-Appearing] : well-appearing [Normal Sclera/Conjunctiva] : normal sclera/conjunctiva [PERRL] : pupils equal round and reactive to light [EOMI] : extraocular movements intact [Normal Outer Ear/Nose] : the outer ears and nose were normal in appearance [Normal Oropharynx] : the oropharynx was normal [No JVD] : no jugular venous distention [Supple] : supple [No Lymphadenopathy] : no lymphadenopathy [Thyroid Normal, No Nodules] : the thyroid was normal and there were no nodules present [No Respiratory Distress] : no respiratory distress  [No Accessory Muscle Use] : no accessory muscle use [Clear to Auscultation] : lungs were clear to auscultation bilaterally [Normal Rate] : normal rate  [Regular Rhythm] : with a regular rhythm [Normal S1, S2] : normal S1 and S2 [No Murmur] : no murmur heard [No Carotid Bruits] : no carotid bruits [No Abdominal Bruit] : a ~M bruit was not heard ~T in the abdomen [No Varicosities] : no varicosities [Pedal Pulses Present] : the pedal pulses are present [No Edema] : there was no peripheral edema [No Palpable Aorta] : no palpable aorta [No Extremity Clubbing/Cyanosis] : no extremity clubbing/cyanosis [Soft] : abdomen soft [Non Tender] : non-tender [Non-distended] : non-distended [No Masses] : no abdominal mass palpated [No HSM] : no HSM [Normal Bowel Sounds] : normal bowel sounds [Normal Posterior Cervical Nodes] : no posterior cervical lymphadenopathy [Normal Anterior Cervical Nodes] : no anterior cervical lymphadenopathy [No CVA Tenderness] : no CVA  tenderness [No Spinal Tenderness] : no spinal tenderness [No Joint Swelling] : no joint swelling [Grossly Normal Strength/Tone] : grossly normal strength/tone [Coordination Grossly Intact] : coordination grossly intact [No Rash] : no rash [No Focal Deficits] : no focal deficits [Normal Gait] : normal gait [Deep Tendon Reflexes (DTR)] : deep tendon reflexes were 2+ and symmetric [Alert and Oriented x3] : oriented to person, place, and time [de-identified] : some reproducible chest tenderness+ [de-identified] : +very anxious

## 2023-08-06 NOTE — ASSESSMENT
[FreeTextEntry1] : Patient is a 32 year old male with past medical history as above who presents for a follow up visit.

## 2023-08-06 NOTE — HISTORY OF PRESENT ILLNESS
[Parent] : parent [FreeTextEntry1] : chest pain [de-identified] :  32 year old male with past medical history of obesity, anxiety, DDD, presents with chest pain with lightheadedness that resulted in having to go to the ER last Friday on 7/21/23. Pt says he thinks his heart is not well and wants to get it checked out. Reprots chest pain is daily, not exertional, sometimes pleuritic and hurts when he presses certain areas especially by his ribs. Told he had costochondritis in past.  Patient has lost 31 pounds since June after he stopped his SSRI, say prior to that SSRI made him gain weight so he stop it. Patient had bloodwork done at [facility] on 7/20/23 recently which was normal and has a colonoscopy scheduled on 7/2823 for diarrhea for the past 2 months. Pt stopped his SSRI months ago and has become very anxious. Dad present at visit says pt is very anxious.   Patient's recent bloodwork indicated microscopic blood in the urine and an abnormal WBC count. Patient given Rx for NST and echocardiogram on 7/26/23. EKG results were normal.

## 2023-08-06 NOTE — HISTORY OF PRESENT ILLNESS
[Family Member] : family member [FreeTextEntry1] : f/u with multiple complaints [de-identified] : This is a 32 year male with a Pmhx of anxiety who presents to the office for follow up. Pt c/o bloody nose and sinus pressure for the past 5 days, longest bleeding episode was 30-40 mins; used Flonase and saline with no relief. Had a colonoscopy with Dr. Mendoza 7/28, showed 8mm sessile polyps and 5mm sessile polyps. Also had an EDG, showed mild inflammation and esophagitis. Questionable short area of allen's. Duodenal erosions. Pt has been on Omeprazole 40mg 2x a day since Friday. He was rec to restart his paroxetine 10mg last week but has not yet, remains very anxious. Denies SI/HI. He also had negative CTAC last week Denies chest pain, SOB, BENITES, dizziness, diaphoresis, palpitations, LE swelling, orthopnea, syncope, n/v.  Denies trauma to head/nose, falls.

## 2023-08-07 ENCOUNTER — RX RENEWAL (OUTPATIENT)
Age: 33
End: 2023-08-07

## 2023-08-08 ENCOUNTER — APPOINTMENT (OUTPATIENT)
Dept: INTERNAL MEDICINE | Facility: CLINIC | Age: 33
End: 2023-08-08
Payer: COMMERCIAL

## 2023-08-08 ENCOUNTER — NON-APPOINTMENT (OUTPATIENT)
Age: 33
End: 2023-08-08

## 2023-08-08 VITALS
BODY MASS INDEX: 33.62 KG/M2 | SYSTOLIC BLOOD PRESSURE: 176 MMHG | DIASTOLIC BLOOD PRESSURE: 80 MMHG | HEIGHT: 69 IN | HEART RATE: 136 BPM | OXYGEN SATURATION: 99 % | WEIGHT: 227 LBS

## 2023-08-08 VITALS — SYSTOLIC BLOOD PRESSURE: 150 MMHG | DIASTOLIC BLOOD PRESSURE: 80 MMHG

## 2023-08-08 VITALS — OXYGEN SATURATION: 99 % | HEART RATE: 115 BPM

## 2023-08-08 DIAGNOSIS — R42 DIZZINESS AND GIDDINESS: ICD-10-CM

## 2023-08-08 DIAGNOSIS — K29.80 DUODENITIS W/OUT BLEEDING: ICD-10-CM

## 2023-08-08 PROCEDURE — 93000 ELECTROCARDIOGRAM COMPLETE: CPT

## 2023-08-08 PROCEDURE — 99214 OFFICE O/P EST MOD 30 MIN: CPT | Mod: 25

## 2023-08-08 RX ORDER — PAROXETINE HYDROCHLORIDE 20 MG/1
20 TABLET, FILM COATED ORAL
Qty: 30 | Refills: 2 | Status: DISCONTINUED | COMMUNITY
Start: 2023-08-07 | End: 2023-08-08

## 2023-08-08 RX ORDER — ALPRAZOLAM 0.5 MG/1
0.5 TABLET ORAL
Qty: 4 | Refills: 0 | Status: DISCONTINUED | COMMUNITY
Start: 2023-07-26 | End: 2023-08-08

## 2023-08-08 RX ORDER — LORAZEPAM 0.5 MG/1
0.5 TABLET ORAL DAILY
Qty: 15 | Refills: 0 | Status: ACTIVE | COMMUNITY
Start: 2021-11-01 | End: 1900-01-01

## 2023-08-08 RX ORDER — PAROXETINE HYDROCHLORIDE 10 MG/1
10 TABLET, FILM COATED ORAL DAILY
Qty: 90 | Refills: 0 | Status: DISCONTINUED | COMMUNITY
Start: 2021-11-01 | End: 2023-08-08

## 2023-08-08 NOTE — HEALTH RISK ASSESSMENT
[0] : 2) Feeling down, depressed, or hopeless: Not at all (0) [PHQ-2 Negative - No further assessment needed] : PHQ-2 Negative - No further assessment needed [Never] : Never [[id number]] : 0 Libtayo Counseling- I discussed with the patient the risks of Libtayo including but not limited to nausea, vomiting, diarrhea, and bone or muscle pain.  The patient verbalized understanding of the proper use and possible adverse effects of Libtayo.  All of the patient's questions and concerns were addressed.

## 2023-08-08 NOTE — REVIEW OF SYSTEMS
[Negative] : Heme/Lymph [Dryness] : dryness [Headache] : headache [Anxiety] : anxiety [FreeTextEntry4] : pressure in nose [de-identified] : bumps on head  [de-identified] : lightheaded

## 2023-08-08 NOTE — HISTORY OF PRESENT ILLNESS
[FreeTextEntry1] : multiple complaints [de-identified] : This is a 32 year male with a Pmhx of anxiety here for f/u. Pt says he did not start paroxetine as he can endoscopy/colonoscopy 2 weeks ago and was diagnosed with collagenous colitis, was advised not to take paroxetine by Dr Mendoza which may be causing this condition Pt reports bp was elevated at few doctors office he saw since last visit at least 150s. Pt did not check bp at home. PT checking pulse ox a lot at home and seen HR is going from 80-130s. Pt is very anxious, not taking xanax and requesting lorazepam instead. Says he has psych appointment with Opal Fierro later this week. Says his nosebleed resolved after he saw and ENT who cauterized it after he saw Dr Will last week. Also feels ongoing lightheadedness and headches. Denies chest pain, SOB, BENITES, , diaphoresis, palpitations, LE swelling, orthopnea, syncope, n/v.  Pt denies focal weakness, vision changes, numbness/tingling, dysphagia, dysarthria.

## 2023-08-08 NOTE — PHYSICAL EXAM
[No Acute Distress] : no acute distress [Well Nourished] : well nourished [Well Developed] : well developed [Well-Appearing] : well-appearing [Normal Sclera/Conjunctiva] : normal sclera/conjunctiva [Normal Outer Ear/Nose] : the outer ears and nose were normal in appearance [Normal Oropharynx] : the oropharynx was normal [No JVD] : no jugular venous distention [No Lymphadenopathy] : no lymphadenopathy [Supple] : supple [No Respiratory Distress] : no respiratory distress  [No Accessory Muscle Use] : no accessory muscle use [Clear to Auscultation] : lungs were clear to auscultation bilaterally [Normal Rate] : normal rate  [Regular Rhythm] : with a regular rhythm [Normal S1, S2] : normal S1 and S2 [No Murmur] : no murmur heard [No Carotid Bruits] : no carotid bruits [No Varicosities] : no varicosities [Pedal Pulses Present] : the pedal pulses are present [No Edema] : there was no peripheral edema [No Extremity Clubbing/Cyanosis] : no extremity clubbing/cyanosis [Soft] : abdomen soft [Non Tender] : non-tender [Non-distended] : non-distended [Normal Bowel Sounds] : normal bowel sounds [Normal Anterior Cervical Nodes] : no anterior cervical lymphadenopathy [No CVA Tenderness] : no CVA  tenderness [No Spinal Tenderness] : no spinal tenderness [No Joint Swelling] : no joint swelling [Grossly Normal Strength/Tone] : grossly normal strength/tone [No Rash] : no rash [Coordination Grossly Intact] : coordination grossly intact [No Focal Deficits] : no focal deficits [Normal Gait] : normal gait [Alert and Oriented x3] : oriented to person, place, and time [de-identified] : +very anxious

## 2023-08-13 PROCEDURE — 93224 XTRNL ECG REC UP TO 48 HRS: CPT

## 2023-08-15 ENCOUNTER — APPOINTMENT (OUTPATIENT)
Dept: INTERNAL MEDICINE | Facility: CLINIC | Age: 33
End: 2023-08-15
Payer: COMMERCIAL

## 2023-08-15 VITALS
DIASTOLIC BLOOD PRESSURE: 80 MMHG | WEIGHT: 268 LBS | SYSTOLIC BLOOD PRESSURE: 130 MMHG | HEIGHT: 69 IN | OXYGEN SATURATION: 99 % | HEART RATE: 70 BPM | BODY MASS INDEX: 39.69 KG/M2

## 2023-08-15 DIAGNOSIS — R51.9 HEADACHE, UNSPECIFIED: ICD-10-CM

## 2023-08-15 DIAGNOSIS — R04.0 EPISTAXIS: ICD-10-CM

## 2023-08-15 DIAGNOSIS — K52.832 LYMPHOCYTIC COLITIS: ICD-10-CM

## 2023-08-15 DIAGNOSIS — M53.84 OTHER SPECIFIED DORSOPATHIES, THORACIC REGION: ICD-10-CM

## 2023-08-15 PROCEDURE — 99214 OFFICE O/P EST MOD 30 MIN: CPT

## 2023-08-20 PROBLEM — R51.9 GENERALIZED HEADACHES: Status: ACTIVE | Noted: 2021-10-11

## 2023-08-20 PROBLEM — K52.832 LYMPHOCYTIC-PLASMACYTIC COLITIS: Status: ACTIVE | Noted: 2023-08-08

## 2023-08-20 PROBLEM — M53.84 THORACIC SPINE DYSFUNCTION: Status: ACTIVE | Noted: 2022-07-26

## 2023-08-20 PROBLEM — R04.0 NOSEBLEED: Status: ACTIVE | Noted: 2023-08-01

## 2023-08-20 RX ORDER — METOPROLOL TARTRATE 25 MG/1
25 TABLET, FILM COATED ORAL
Qty: 90 | Refills: 0 | Status: DISCONTINUED | COMMUNITY
Start: 2023-08-08 | End: 2023-08-20

## 2023-08-20 NOTE — HEALTH RISK ASSESSMENT
[0] : 2) Feeling down, depressed, or hopeless: Not at all (0) [PHQ-2 Negative - No further assessment needed] : PHQ-2 Negative - No further assessment needed [Never] : Never [[id number]] : 0

## 2023-08-20 NOTE — HISTORY OF PRESENT ILLNESS
[FreeTextEntry1] : f/u on multiple issues [de-identified] : This is a 32 year male with a Pmhx of anxiety, DDD, collagenous colitis who presents to the office for follow up. Still have intermittent headaches and tightness at the back of his neck; saw pain management yesterday and is getting injections for his cervical spine protrusions saw neuro and is pending brain MRI. Also has f/u with psychiatrist Dr. Leatha Munoz  this week to discuss new medication options given limitations from his collageous colitis. I gave him a list of meds that Gi Dr Mendoza gave me which can contribute to collagenous colitis and what to avoid.  Was started on metoprolol 25mg bid but only take metoprolol 25mg qhs and his anxiety, bp and hr all improved. Denies chest pain, SOB, BENITES, dizziness, diaphoresis, palpitations, LE swelling, orthopnea, syncope, n/v.  Pt denies focal weakness, vision changes, numbness/tingling, dysphagia, dysarthria.

## 2023-08-24 ENCOUNTER — NON-APPOINTMENT (OUTPATIENT)
Age: 33
End: 2023-08-24

## 2023-08-25 ENCOUNTER — NON-APPOINTMENT (OUTPATIENT)
Age: 33
End: 2023-08-25

## 2023-08-25 ENCOUNTER — APPOINTMENT (OUTPATIENT)
Dept: INTERNAL MEDICINE | Facility: CLINIC | Age: 33
End: 2023-08-25
Payer: COMMERCIAL

## 2023-08-25 ENCOUNTER — APPOINTMENT (OUTPATIENT)
Dept: PULMONOLOGY | Facility: CLINIC | Age: 33
End: 2023-08-25
Payer: COMMERCIAL

## 2023-08-25 VITALS
TEMPERATURE: 99.7 F | SYSTOLIC BLOOD PRESSURE: 140 MMHG | HEIGHT: 69 IN | DIASTOLIC BLOOD PRESSURE: 80 MMHG | BODY MASS INDEX: 39.69 KG/M2 | HEART RATE: 76 BPM | WEIGHT: 268 LBS | OXYGEN SATURATION: 99 %

## 2023-08-25 VITALS — TEMPERATURE: 99.1 F

## 2023-08-25 DIAGNOSIS — J02.8 ACUTE PHARYNGITIS DUE TO OTHER SPECIFIED ORGANISMS: ICD-10-CM

## 2023-08-25 DIAGNOSIS — R03.0 ELEVATED BLOOD-PRESSURE READING, W/OUT DIAGNOSIS OF HYPERTENSION: ICD-10-CM

## 2023-08-25 DIAGNOSIS — R14.2 ERUCTATION: ICD-10-CM

## 2023-08-25 DIAGNOSIS — R00.0 TACHYCARDIA, UNSPECIFIED: ICD-10-CM

## 2023-08-25 DIAGNOSIS — R31.29 OTHER MICROSCOPIC HEMATURIA: ICD-10-CM

## 2023-08-25 DIAGNOSIS — B97.89 ACUTE PHARYNGITIS DUE TO OTHER SPECIFIED ORGANISMS: ICD-10-CM

## 2023-08-25 DIAGNOSIS — R30.0 DYSURIA: ICD-10-CM

## 2023-08-25 DIAGNOSIS — K20.90 ESOPHAGITIS, UNSPECIFIED WITHOUT BLEEDING: ICD-10-CM

## 2023-08-25 PROCEDURE — 99214 OFFICE O/P EST MOD 30 MIN: CPT | Mod: 25

## 2023-08-25 PROCEDURE — 93000 ELECTROCARDIOGRAM COMPLETE: CPT

## 2023-08-25 PROCEDURE — 71046 X-RAY EXAM CHEST 2 VIEWS: CPT

## 2023-08-25 RX ORDER — ALUMINUM HYDROXIDE AND MAGNESIUM CARBONATE 160; 105 MG/1; MG/1
160-105 TABLET, CHEWABLE ORAL
Qty: 30 | Refills: 0 | Status: ACTIVE | COMMUNITY
Start: 2023-08-25 | End: 1900-01-01

## 2023-08-25 RX ORDER — METOPROLOL SUCCINATE 25 MG/1
25 TABLET, EXTENDED RELEASE ORAL
Qty: 90 | Refills: 0 | Status: ACTIVE | COMMUNITY
Start: 2023-08-20 | End: 1900-01-01

## 2023-08-27 PROBLEM — K20.90 ACUTE ESOPHAGITIS: Status: ACTIVE | Noted: 2023-08-01

## 2023-08-27 PROBLEM — R14.2 BELCHING: Status: ACTIVE | Noted: 2021-10-14

## 2023-08-27 PROBLEM — R31.29 MICROSCOPIC HEMATURIA: Status: ACTIVE | Noted: 2021-10-14

## 2023-08-27 PROBLEM — J02.8 SORE THROAT (VIRAL): Status: ACTIVE | Noted: 2023-08-25 | Resolved: 2023-09-24

## 2023-08-27 PROBLEM — R03.0 ELEVATED BLOOD PRESSURE READING: Status: ACTIVE | Noted: 2022-08-24

## 2023-08-27 PROBLEM — R00.0 SINUS TACHYCARDIA: Status: ACTIVE | Noted: 2023-08-08

## 2023-08-27 PROBLEM — R30.0 DYSURIA: Status: ACTIVE | Noted: 2023-08-25

## 2023-08-27 LAB
ALBUMIN SERPL ELPH-MCNC: 4.7 G/DL
ALP BLD-CCNC: 80 U/L
ALT SERPL-CCNC: 45 U/L
AMYLASE/CREAT SERPL: 31 U/L
ANION GAP SERPL CALC-SCNC: 16 MMOL/L
APPEARANCE: CLEAR
AST SERPL-CCNC: 26 U/L
BACTERIA THROAT CULT: NORMAL
BACTERIA UR CULT: NORMAL
BACTERIA: NEGATIVE /HPF
BILIRUB SERPL-MCNC: 0.7 MG/DL
BILIRUBIN URINE: NEGATIVE
BLOOD URINE: ABNORMAL
BUN SERPL-MCNC: 13 MG/DL
C TRACH RRNA SPEC QL NAA+PROBE: NOT DETECTED
CALCIUM SERPL-MCNC: 10.3 MG/DL
CAST: 0 /LPF
CHLORIDE SERPL-SCNC: 100 MMOL/L
CO2 SERPL-SCNC: 24 MMOL/L
COLOR: YELLOW
CREAT SERPL-MCNC: 1.22 MG/DL
EGFR: 81 ML/MIN/1.73M2
EPITHELIAL CELLS: 0 /HPF
GLUCOSE QUALITATIVE U: NEGATIVE MG/DL
GLUCOSE SERPL-MCNC: 80 MG/DL
KETONES URINE: ABNORMAL MG/DL
LEUKOCYTE ESTERASE URINE: NEGATIVE
LPL SERPL-CCNC: 19 U/L
MICROSCOPIC-UA: NORMAL
N GONORRHOEA RRNA SPEC QL NAA+PROBE: NOT DETECTED
NITRITE URINE: NEGATIVE
PH URINE: 6
POTASSIUM SERPL-SCNC: 4.1 MMOL/L
PROCALCITONIN SERPL-MCNC: 0.03 NG/ML
PROT SERPL-MCNC: 8.5 G/DL
PROTEIN URINE: NEGATIVE MG/DL
RAPID RVP RESULT: NOT DETECTED
RED BLOOD CELLS URINE: 1 /HPF
REVIEW: NORMAL
S PYO DNA THROAT QL NAA+PROBE: NOT DETECTED
SARS-COV-2 RNA PNL RESP NAA+PROBE: NOT DETECTED
SODIUM SERPL-SCNC: 141 MMOL/L
SOURCE AMPLIFICATION: NORMAL
SPECIFIC GRAVITY URINE: 1.02
UROBILINOGEN URINE: 0.2 MG/DL
WHITE BLOOD CELLS URINE: 0 /HPF

## 2023-08-27 NOTE — HEALTH RISK ASSESSMENT
[0] : 2) Feeling down, depressed, or hopeless: Not at all (0) [PHQ-2 Negative - No further assessment needed] : PHQ-2 Negative - No further assessment needed [Never] : Never [[id number]] : 0

## 2023-08-27 NOTE — HISTORY OF PRESENT ILLNESS
[Spouse] : spouse [FreeTextEntry8] : This is a 32 year male with a Pmhx of anxiety, DDD, collagenous colitis who presents to the office for c/o burning epigastric discomfort and burning chest pain and throat, burping, and belching a lot for past few days. Has been taking famotidine 40mg AM and 20mg PM  with no relief. Was told by GI Dr Mendoza not to take PPI because it can worsen his collagenous colitis.  Pt is anxious and thinks he may have strep or COVID as he states he had throat burning and belching when he previously had COVID. He tested for COVID on Monday but result was negative. Also had diarrhea on Monday night that resolved. Denies constipation frequency,  urgency, hematuria.  Denies SOB, BENITES, dizziness, diaphoresis, palpitations, LE swelling, orthopnea, syncope, n/v, headache. Think he has dysuria but not sure, no f/c.

## 2023-11-15 ENCOUNTER — APPOINTMENT (OUTPATIENT)
Dept: INTERNAL MEDICINE | Facility: CLINIC | Age: 33
End: 2023-11-15
Payer: COMMERCIAL

## 2023-11-15 ENCOUNTER — NON-APPOINTMENT (OUTPATIENT)
Age: 33
End: 2023-11-15

## 2023-11-15 VITALS
WEIGHT: 254 LBS | TEMPERATURE: 98.4 F | SYSTOLIC BLOOD PRESSURE: 134 MMHG | DIASTOLIC BLOOD PRESSURE: 80 MMHG | OXYGEN SATURATION: 99 % | BODY MASS INDEX: 37.62 KG/M2 | HEART RATE: 79 BPM | HEIGHT: 69 IN

## 2023-11-15 DIAGNOSIS — R07.81 PLEURODYNIA: ICD-10-CM

## 2023-11-15 PROCEDURE — 99203 OFFICE O/P NEW LOW 30 MIN: CPT

## 2023-11-15 PROCEDURE — 99213 OFFICE O/P EST LOW 20 MIN: CPT

## 2023-12-04 ENCOUNTER — APPOINTMENT (OUTPATIENT)
Dept: INTERNAL MEDICINE | Facility: CLINIC | Age: 33
End: 2023-12-04
Payer: COMMERCIAL

## 2023-12-04 VITALS
SYSTOLIC BLOOD PRESSURE: 140 MMHG | RESPIRATION RATE: 18 BRPM | WEIGHT: 258.4 LBS | HEART RATE: 78 BPM | OXYGEN SATURATION: 98 % | HEIGHT: 69 IN | TEMPERATURE: 97.3 F | BODY MASS INDEX: 38.27 KG/M2 | DIASTOLIC BLOOD PRESSURE: 90 MMHG

## 2023-12-04 DIAGNOSIS — R19.7 DIARRHEA, UNSPECIFIED: ICD-10-CM

## 2023-12-04 DIAGNOSIS — F41.9 ANXIETY DISORDER, UNSPECIFIED: ICD-10-CM

## 2023-12-04 DIAGNOSIS — R07.89 OTHER CHEST PAIN: ICD-10-CM

## 2023-12-04 LAB
ALBUMIN SERPL ELPH-MCNC: 5 G/DL
ALP BLD-CCNC: 84 U/L
ALT SERPL-CCNC: 26 U/L
AMYLASE/CREAT SERPL: 30 U/L
ANION GAP SERPL CALC-SCNC: 12 MMOL/L
AST SERPL-CCNC: 21 U/L
BILIRUB DIRECT SERPL-MCNC: 0.2 MG/DL
BILIRUB INDIRECT SERPL-MCNC: 0.5 MG/DL
BILIRUB SERPL-MCNC: 0.6 MG/DL
BUN SERPL-MCNC: 13 MG/DL
CALCIUM SERPL-MCNC: 9.9 MG/DL
CHLORIDE SERPL-SCNC: 101 MMOL/L
CHOLEST SERPL-MCNC: 161 MG/DL
CO2 SERPL-SCNC: 26 MMOL/L
CREAT SERPL-MCNC: 1 MG/DL
EGFR: 103 ML/MIN/1.73M2
ESTIMATED AVERAGE GLUCOSE: 97 MG/DL
GLUCOSE SERPL-MCNC: 87 MG/DL
HBA1C MFR BLD HPLC: 5 %
HCT VFR BLD CALC: 46.2 %
HDLC SERPL-MCNC: 39 MG/DL
HGB BLD-MCNC: 15.5 G/DL
LDLC SERPL CALC-MCNC: 105 MG/DL
LPL SERPL-CCNC: 18 U/L
MCHC RBC-ENTMCNC: 29.9 PG
MCHC RBC-ENTMCNC: 33.5 GM/DL
MCV RBC AUTO: 89.2 FL
NONHDLC SERPL-MCNC: 122 MG/DL
PLATELET # BLD AUTO: 315 K/UL
POTASSIUM SERPL-SCNC: 3.7 MMOL/L
PROT SERPL-MCNC: 8.5 G/DL
RBC # BLD: 5.18 M/UL
RBC # FLD: 12.9 %
SODIUM SERPL-SCNC: 139 MMOL/L
TRIGL SERPL-MCNC: 96 MG/DL
TSH SERPL-ACNC: 0.49 UIU/ML
WBC # FLD AUTO: 13.51 K/UL

## 2023-12-04 PROCEDURE — 99213 OFFICE O/P EST LOW 20 MIN: CPT | Mod: 25

## 2023-12-04 PROCEDURE — 93000 ELECTROCARDIOGRAM COMPLETE: CPT

## 2023-12-21 NOTE — PHYSICAL EXAM
[No Acute Distress] : no acute distress [Normal Sclera/Conjunctiva] : normal sclera/conjunctiva [Normal Outer Ear/Nose] : the outer ears and nose were normal in appearance [No JVD] : no jugular venous distention [No Respiratory Distress] : no respiratory distress  [No Accessory Muscle Use] : no accessory muscle use [Clear to Auscultation] : lungs were clear to auscultation bilaterally [Normal Rate] : normal rate  [Regular Rhythm] : with a regular rhythm [Normal S1, S2] : normal S1 and S2 [No Edema] : there was no peripheral edema [Soft] : abdomen soft [No CVA Tenderness] : no CVA  tenderness [No Joint Swelling] : no joint swelling

## 2023-12-21 NOTE — REVIEW OF SYSTEMS
[Fever] : no fever [Night Sweats] : no night sweats [Discharge] : no discharge [Vision Problems] : no vision problems [Earache] : no earache [Nasal Discharge] : no nasal discharge [Chest Pain] : chest pain [Shortness Of Breath] : no shortness of breath [Abdominal Pain] : no abdominal pain [Vomiting] : no vomiting

## 2023-12-21 NOTE — HISTORY OF PRESENT ILLNESS
[FreeTextEntry8] : 33 year old male with complaints of left sided chest pain for several years.  There has been times where its been better and worse.  The rib pain he has had has been on/off for months.  He takes ibuprofen for pain which has only minimally alleviated pain.   [Spouse] : spouse

## 2023-12-21 NOTE — PLAN
[FreeTextEntry1] : chest pain - cardiac work-up negative to date, pt declines labs today, advised symptomatic relief and f/u with cardiologist Elevated CPK/myalgia - pt declines repeating CPK levels today, advised hydration and rheumatology evaluation (family hx of psoriatric arthritis as well) Obesity/elevated BP - advised diet/ls modifications, f/u in 2-4 weeks for BP check

## 2024-02-01 ENCOUNTER — APPOINTMENT (OUTPATIENT)
Dept: RHEUMATOLOGY | Facility: CLINIC | Age: 34
End: 2024-02-01

## 2024-03-06 ENCOUNTER — APPOINTMENT (OUTPATIENT)
Dept: INTERNAL MEDICINE | Facility: CLINIC | Age: 34
End: 2024-03-06

## 2025-06-19 ENCOUNTER — APPOINTMENT (OUTPATIENT)
Dept: ORTHOPEDIC SURGERY | Facility: CLINIC | Age: 35
End: 2025-06-19
Payer: COMMERCIAL

## 2025-06-19 PROBLEM — M75.22 BICEPS TENDINITIS OF LEFT UPPER EXTREMITY: Status: ACTIVE | Noted: 2025-06-19

## 2025-06-19 PROBLEM — M75.102 ROTATOR CUFF SYNDROME OF LEFT SHOULDER: Status: ACTIVE | Noted: 2025-06-19

## 2025-06-19 PROBLEM — M75.02 ADHESIVE CAPSULITIS OF LEFT SHOULDER: Status: ACTIVE | Noted: 2025-06-19

## 2025-06-19 PROBLEM — M25.522 LEFT ELBOW PAIN: Status: ACTIVE | Noted: 2025-06-19

## 2025-06-19 PROBLEM — M65.4 DE QUERVAIN'S TENOSYNOVITIS, LEFT: Status: ACTIVE | Noted: 2025-06-19

## 2025-06-19 PROCEDURE — 99203 OFFICE O/P NEW LOW 30 MIN: CPT

## 2025-06-19 PROCEDURE — 73030 X-RAY EXAM OF SHOULDER: CPT | Mod: LT

## 2025-06-19 PROCEDURE — 73080 X-RAY EXAM OF ELBOW: CPT | Mod: LT

## 2025-06-19 PROCEDURE — 73110 X-RAY EXAM OF WRIST: CPT | Mod: LT

## 2025-06-19 RX ORDER — PAROXETINE HYDROCHLORIDE 40 MG/1
TABLET, FILM COATED ORAL
Refills: 0 | Status: ACTIVE | COMMUNITY

## 2025-07-31 ENCOUNTER — APPOINTMENT (OUTPATIENT)
Dept: ORTHOPEDIC SURGERY | Facility: CLINIC | Age: 35
End: 2025-07-31